# Patient Record
Sex: FEMALE | Race: BLACK OR AFRICAN AMERICAN | NOT HISPANIC OR LATINO | URBAN - METROPOLITAN AREA
[De-identification: names, ages, dates, MRNs, and addresses within clinical notes are randomized per-mention and may not be internally consistent; named-entity substitution may affect disease eponyms.]

---

## 2018-10-31 ENCOUNTER — EMERGENCY (EMERGENCY)
Facility: HOSPITAL | Age: 28
LOS: 1 days | Discharge: HOME | End: 2018-10-31
Attending: EMERGENCY MEDICINE | Admitting: EMERGENCY MEDICINE
Payer: MEDICAID

## 2018-10-31 VITALS
TEMPERATURE: 99 F | DIASTOLIC BLOOD PRESSURE: 72 MMHG | HEART RATE: 97 BPM | OXYGEN SATURATION: 100 % | RESPIRATION RATE: 18 BRPM | SYSTOLIC BLOOD PRESSURE: 120 MMHG

## 2018-10-31 DIAGNOSIS — Z98.89 OTHER SPECIFIED POSTPROCEDURAL STATES: Chronic | ICD-10-CM

## 2018-10-31 LAB
ALBUMIN SERPL ELPH-MCNC: 4.3 G/DL — SIGNIFICANT CHANGE UP (ref 3.3–5)
ALP SERPL-CCNC: 67 U/L — SIGNIFICANT CHANGE UP (ref 40–120)
ALT FLD-CCNC: 15 U/L — SIGNIFICANT CHANGE UP (ref 4–33)
AST SERPL-CCNC: 34 U/L — HIGH (ref 4–32)
BASOPHILS # BLD AUTO: 0.02 K/UL — SIGNIFICANT CHANGE UP (ref 0–0.2)
BASOPHILS NFR BLD AUTO: 0.3 % — SIGNIFICANT CHANGE UP (ref 0–2)
BILIRUB SERPL-MCNC: 0.3 MG/DL — SIGNIFICANT CHANGE UP (ref 0.2–1.2)
BUN SERPL-MCNC: 6 MG/DL — LOW (ref 7–23)
CALCIUM SERPL-MCNC: 9.5 MG/DL — SIGNIFICANT CHANGE UP (ref 8.4–10.5)
CHLORIDE SERPL-SCNC: 104 MMOL/L — SIGNIFICANT CHANGE UP (ref 98–107)
CO2 SERPL-SCNC: 24 MMOL/L — SIGNIFICANT CHANGE UP (ref 22–31)
CREAT SERPL-MCNC: 0.74 MG/DL — SIGNIFICANT CHANGE UP (ref 0.5–1.3)
EOSINOPHIL # BLD AUTO: 0.64 K/UL — HIGH (ref 0–0.5)
EOSINOPHIL NFR BLD AUTO: 10.8 % — HIGH (ref 0–6)
GLUCOSE SERPL-MCNC: 83 MG/DL — SIGNIFICANT CHANGE UP (ref 70–99)
HCT VFR BLD CALC: 35.5 % — SIGNIFICANT CHANGE UP (ref 34.5–45)
HGB BLD-MCNC: 11.7 G/DL — SIGNIFICANT CHANGE UP (ref 11.5–15.5)
IMM GRANULOCYTES # BLD AUTO: 0.01 # — SIGNIFICANT CHANGE UP
IMM GRANULOCYTES NFR BLD AUTO: 0.2 % — SIGNIFICANT CHANGE UP (ref 0–1.5)
LYMPHOCYTES # BLD AUTO: 2.18 K/UL — SIGNIFICANT CHANGE UP (ref 1–3.3)
LYMPHOCYTES # BLD AUTO: 36.9 % — SIGNIFICANT CHANGE UP (ref 13–44)
MCHC RBC-ENTMCNC: 33 % — SIGNIFICANT CHANGE UP (ref 32–36)
MCHC RBC-ENTMCNC: 33 PG — SIGNIFICANT CHANGE UP (ref 27–34)
MCV RBC AUTO: 100 FL — SIGNIFICANT CHANGE UP (ref 80–100)
MONOCYTES # BLD AUTO: 0.44 K/UL — SIGNIFICANT CHANGE UP (ref 0–0.9)
MONOCYTES NFR BLD AUTO: 7.5 % — SIGNIFICANT CHANGE UP (ref 2–14)
NEUTROPHILS # BLD AUTO: 2.61 K/UL — SIGNIFICANT CHANGE UP (ref 1.8–7.4)
NEUTROPHILS NFR BLD AUTO: 44.3 % — SIGNIFICANT CHANGE UP (ref 43–77)
NRBC # FLD: 0 — SIGNIFICANT CHANGE UP
PLATELET # BLD AUTO: 228 K/UL — SIGNIFICANT CHANGE UP (ref 150–400)
PMV BLD: 12.8 FL — SIGNIFICANT CHANGE UP (ref 7–13)
POTASSIUM SERPL-MCNC: 3.6 MMOL/L — SIGNIFICANT CHANGE UP (ref 3.5–5.3)
POTASSIUM SERPL-SCNC: 3.6 MMOL/L — SIGNIFICANT CHANGE UP (ref 3.5–5.3)
PROT SERPL-MCNC: 7.4 G/DL — SIGNIFICANT CHANGE UP (ref 6–8.3)
RBC # BLD: 3.55 M/UL — LOW (ref 3.8–5.2)
RBC # FLD: 13.1 % — SIGNIFICANT CHANGE UP (ref 10.3–14.5)
SODIUM SERPL-SCNC: 140 MMOL/L — SIGNIFICANT CHANGE UP (ref 135–145)
WBC # BLD: 5.9 K/UL — SIGNIFICANT CHANGE UP (ref 3.8–10.5)
WBC # FLD AUTO: 5.9 K/UL — SIGNIFICANT CHANGE UP (ref 3.8–10.5)

## 2018-10-31 PROCEDURE — 99283 EMERGENCY DEPT VISIT LOW MDM: CPT

## 2018-10-31 RX ORDER — ACETAMINOPHEN 500 MG
975 TABLET ORAL ONCE
Qty: 0 | Refills: 0 | Status: COMPLETED | OUTPATIENT
Start: 2018-10-31 | End: 2018-10-31

## 2018-10-31 RX ADMIN — Medication 975 MILLIGRAM(S): at 13:38

## 2018-10-31 NOTE — CONSULT NOTE ADULT - ASSESSMENT
28 Y F with PMH of migraine headache presented to ED for evaluation of headache and right sided weakness. As per patient, she had multiple episodes in the past for headache, where she will have intermittent scintillating scotoma, numbness over right side, weaknesses over right side, word finding difficulties. These aura usually coincides with her headache. Aura will last for half hour to few hours and will resolve. Headache is usually unilateral. right sided, crescendo-decrescendo pattern, associated with nausea, photophobia/phonophobia. She is following outside with 2 neurologist. She is recently started on amitriptyline and Butalbital. She had multiple MRIs in the past which were negative.   Her recent headache started on Saturday. Currently she has 6/10 headache. Neurological examination was positive for subtle weakness on right side with subjective numbness.     Impression     multiple episodes of transient focal neurological deficits   Differential diagnosis includes Migraine, TIA and seizure - Her presentation is not matching with seizure and TIA   Not suspicious for MS as she had episodes lasting for few hours and resolved each time spontaneously for numerous times     Plan     Migraine cocktail   Reglan 10 mg IV , Benadryl 25 mg IV   Toradol 30 mg IV   Magnesium 2 gram IV     She can be discharged on medrol dose pack if headache resolves with above medications. If no improvement in headache, she will need admission for DHE protocol. She can follow with her neurologist or our headache specialist  at [address] [place] [address].   Dexamethsone 28 Y F with PMH of migraine headache presented to ED for evaluation of headache and right sided weakness. As per patient, she had multiple episodes in the past for headache, where she will have intermittent scintillating scotoma, numbness over right side, weaknesses over right side, word finding difficulties. These aura usually coincides with her headache. Aura will last for half hour to few hours and will resolve. Headache is usually unilateral. right sided, crescendo-decrescendo pattern, associated with nausea, photophobia/phonophobia. She is following outside with 2 neurologist. She is recently started on amitriptyline and Butalbital. She had multiple MRIs in the past which were negative.   Her recent headache started on Saturday. Currently she has 6/10 headache. Neurological examination was positive for subtle weakness on right side with subjective numbness.     Impression     multiple episodes of transient focal neurological deficits   Differential diagnosis includes Migraine, TIA and seizure - Her presentation is not matching with seizure and TIA   Not suspicious for MS as she had episodes lasting for few hours and resolved each time spontaneously for numerous times     Plan     Migraine cocktail   Reglan 10 mg IV , Benadryl 25 mg IV   Toradol 30 mg IV   Magnesium 2 gram IV   Dexamethasone 10  IV     She can be discharged on medrol dose pack if headache resolves with above medications. If no improvement in headache, she will need admission for DHE protocol. She can follow with her neurologist or our headache specialist  at [address] [place] [address].     	  Case discussed with .

## 2018-10-31 NOTE — ED PROVIDER NOTE - PMH
Asthma    Atrophy of the muscles  Right upper extremity  Cluster headache    Dizziness    Rhinitis, chronic    Weakness of right side of body

## 2018-10-31 NOTE — ED PROVIDER NOTE - ATTENDING CONTRIBUTION TO CARE
DR. HERNANDEZ, ATTENDING MD-  I performed a face to face bedside interview with patient regarding history of present illness, review of symptoms and past medical history. I completed an independent physical exam.  I have discussed patient's plan of care with the resident.   Documentation as above in the note.    Jose Alejandro: c/o acute on chronic weakness/coldness to right side of body (arm and leg) today.  Has h/o similar sx for "years", including admission in 2013 (records reviewed).  Saw a neurologist 2 days ago for similar sx, at which time an EMG was done.  Has not received results yet, and is pending an MRI.  On exam: well appearing, 2+rt radial pulse, 2+ rt DP pulse, no swelling to RUE or RLE.  Neuro exam: aaox4, fluent speech, CN2-12 intact. No gross sens deficits.  Motor 4/5 rt , and 4/5 throughout RLE.  No rigidity.  brisk and equal patellar reflexes.    A/P: acute on chronic rt sided weakness. Patient has already had extensive imaging for the same complaint, so I do not plan on checking CT.  but out of concern that this is MS with acute flare, or other unclear neurologic process, will consult neuro

## 2018-10-31 NOTE — ED ADULT NURSE NOTE - OBJECTIVE STATEMENT
Pt received a&ox3, c/o right extremity tingling/coldness/numbness x 2 years worse x 2 days, pt has seen Neuro outpatient w neg MRI/CT, pt denies any other neuro deficits, no facial droop/slurred speech/unilateral weakness/decreased unilateral sensation noted, pt radial and pedal pulses equal and appropriate, pt denies any difficulty w gait, NAD, MD eval performed, 20 gauge IV placed in left ac, labs drawn and sent, pt appears comfortable. Pt received a&ox3, c/o right extremity tingling/coldness/numbness x 2 years worse x 2 days, pt has seen Neuro outpatient w neg MRI/CT, pt denies any other neuro deficits, denies dizziness/lightheadedness, no facial droop/slurred speech/unilateral weakness/decreased unilateral sensation noted, pt radial and pedal pulses equal and appropriate, pt denies any difficulty w gait, pt also c/o chronic headaches x 2 years, no worsening or change in acuity of headache, NAD, MD eval performed, 20 gauge IV placed in left ac, labs drawn and sent, pt appears comfortable. Pt received a&ox3, c/o right extremity tingling/coldness/numbness x 2 years worse x 2 days, pt has seen Neuro outpatient w neg MRI/CT, pt denies any other neuro deficits, denies dizziness/lightheadedness/blurry vision/NV, no facial droop/slurred speech/unilateral weakness/decreased unilateral sensation noted, pt radial and pedal pulses equal and appropriate, pt denies any difficulty w gait, pt also c/o chronic headaches x 2 years, no worsening or change in acuity of headache, NAD, MD eval performed, 20 gauge IV placed in left ac, labs drawn and sent, pt appears comfortable.

## 2018-10-31 NOTE — ED ADULT TRIAGE NOTE - CHIEF COMPLAINT QUOTE
patient c/o a headache since Saturday, coldness on the right side of her body with numbness and tingling. Patient states the coldness comes and goes for the past 10 years but has been consistent since Saturday. Right hand noted colder that the left in triage. Patient states she has had and MRI for this 2 years ago. PMH of asthma, no wheezing in triage. Patient states that she feesl nervous.

## 2018-10-31 NOTE — ED PROVIDER NOTE - NSFOLLOWUPINSTRUCTIONS_ED_ALL_ED_FT
1. You were seen by a neurologist today and believed to have a complex migraine. Please follow up with Dr. Fry in 1-3 days and let him/her know you were seen here.  2. Take 600mg Motrin (also called Advil or Ibuprofen) every 6 hours as needed for fever/pain/discomfort/swelling. You can take this with Tylenol 650 mg (also called acetaminophen) every 6 hours.     Don't use more than 3500mg of Tylenol in any 24-hour period. Make sure your other prescription/over-the-counter medications don't contain any Tylenol so you don't take too much.     If you have any stomach discomfort while taking Motrin, you can use TUMS or Pepcid or Zantac (these can all be bought without a prescription).   3. Please return to the ED should you have any new or worsening symptoms or have any new concerns.   4. Read all attached.

## 2018-10-31 NOTE — ED PROVIDER NOTE - OBJECTIVE STATEMENT
Peggy Keene, DO: 28F wit Peggy Yecenia, DO: 28F with hx of cluster HA & asthma here for cold RUE and RLE x 4 days. Symptoms intermittent, associated with intermittent wrist pain & hip pain. Patient has been seen by Neurologist for similar symptoms x 10 years that are progressively worsening. No discoloration of extremities, no fevers, chest pain, SOB.

## 2018-10-31 NOTE — ED PROVIDER NOTE - MEDICAL DECISION MAKING DETAILS
Peggy Keene, DO: 28F with hx neurologic w/u including EMG several days ago with reportedly elevated ESR & hx of MR 10 years ago reportedly normal with progressively worsening symptoms here for cool R side extremities. No subjective or objective discoloration. Extremities with equal pulses.

## 2018-10-31 NOTE — ED PROVIDER NOTE - PROGRESS NOTE DETAILS
Jose Alejandro: neuro has seen and evaluated.  They feel her story and exam are more c/w complex migraine and that she is stable for outpatient follow-up. Peggy Keene DO: Patient notified of discharge instruction but left prior to receiving discharge paperwork. IV d/c'd by RN. Stable for o/p f/u.

## 2018-10-31 NOTE — CONSULT NOTE ADULT - SUBJECTIVE AND OBJECTIVE BOX
HPI:    28 Y F with PMH of migraine headache presented to ED for evaluation of headache and right sided weakness. As per patient, she had multiple episodes in the past for headache, where she will have intermittent scintillating scotoma, numbness over right side, weaknesses over right side, word finding difficulties. These aura usually coincides with her headache. Aura will last for half hour to few hours and will resolve. Headache is usually unilateral. right sided, crescendo-decrescendo pattern, associated with nausea, photophobia/phonophobia. She is following outside with 2 neurologist. She is recently started on amitriptyline and Butalbital. She had multiple MRIs in the past which were negative.   Her recent headache started on Saturday. Currently she has 6/10 headache.     MEDICATIONS  (STANDING):    MEDICATIONS  (PRN):      PAST MEDICAL & SURGICAL HISTORY:  Weakness of right side of body  Dizziness  Atrophy of the muscles: Right upper extremity  Rhinitis, chronic  Cluster headache  Asthma  H/O surgical biopsy: of mandible, negative per pt.  S/P ovarian cystectomy      FAMILY HISTORY:  Family history of melanoma (Grandparent)  Family history of lymphosarcoma (Grandparent)  Family history of renal disease (Father)  Family history of asthma (Father)  Family history of stroke (Mother)  Family history of ankylosing spondylitis (Sibling)  Family history of early CAD (Father)  Family history of diabetes mellitus (Father, Grandparent)      Allergies    eggplant (Angioedema)  No Known Drug Allergies    Intolerances          SHx - No smoking, No ETOH, No drug abuse      Review of Systems:  CONSTITUTIONAL:  No weight loss, fever, chills, weakness or fatigue.  HEENT:  Eyes:  No visual loss, blurred vision, double vision or yellow sclerae. Ears, Nose, Throat:  No hearing loss, sneezing, congestion, runny nose or sore throat.  SKIN:  No rash or itching.  CARDIOVASCULAR:  No chest pain, chest pressure or chest discomfort. No palpitations or edema.  RESPIRATORY:  No shortness of breath, cough or sputum.  GASTROINTESTINAL:  No anorexia, nausea, vomiting or diarrhea. No abdominal pain or blood.  GENITOURINARY:  NO Burning on urination.   NEUROLOGICAL: See HPI  MUSCULOSKELETAL:  No muscle, back pain, joint pain or stiffness.  HEMATOLOGIC:  No anemia, bleeding or bruising.  LYMPHATICS:  No enlarged nodes. No history of splenectomy.  PSYCHIATRIC:  No history of depression or anxiety.  ENDOCRINOLOGIC:  No reports of sweating, cold or heat intolerance. No polyuria or polydipsia.  ALLERGIES:  No history of asthma, hives, eczema or rhinitis.        Vital Signs Last 24 Hrs  T(C): 37 (31 Oct 2018 12:18), Max: 37 (31 Oct 2018 12:18)  T(F): 98.6 (31 Oct 2018 12:18), Max: 98.6 (31 Oct 2018 12:18)  HR: 97 (31 Oct 2018 12:18) (97 - 97)  BP: 120/72 (31 Oct 2018 12:18) (120/72 - 120/72)  BP(mean): --  RR: 18 (31 Oct 2018 12:18) (18 - 18)  SpO2: 100% (31 Oct 2018 12:18) (100% - 100%)    General Exam:   General appearance: No acute distress                   Neurological Exam:    Mental Status: Orientated to self, date and place.  Attention intact.  No dysarthria, aphasia or neglect.  Cranial Nerves: PERRL, EOMI, CN V1-3 intact to light touch and pinprick.  No facial asymmetry, Tongue, uvula and palate midline.    Motor:   Tone: normal.                  Strength: without any drifts, subtle weakness on right side UE and LE   Dysmetria: None to finger-nose-finger or heel-shin-heel  No truncal ataxia.    Tremor: No resting, postural or action tremor.  No myoclonus.  Sensation: subjective numbness over RLE   Deep Tendon Reflexes: 2+ bilateral biceps, triceps, brachioradialis, knee and ankle  Toes flexor bilaterally  Gait: normal and stable.      Other:    10-31    140  |  104  |  6<L>  ----------------------------<  83  3.6   |  24  |  0.74    Ca    9.5      31 Oct 2018 13:45    TPro  7.4  /  Alb  4.3  /  TBili  0.3  /  DBili  x   /  AST  34<H>  /  ALT  15  /  AlkPhos  67  10-31    10-31    140  |  104  |  6<L>  ----------------------------<  83  3.6   |  24  |  0.74    Ca    9.5      31 Oct 2018 13:45    TPro  7.4  /  Alb  4.3  /  TBili  0.3  /  DBili  x   /  AST  34<H>  /  ALT  15  /  AlkPhos  67  10-31                          11.7   5.90  )-----------( 228      ( 31 Oct 2018 13:45 )             35.5       Radiology    no recent imaging

## 2018-10-31 NOTE — ED ADULT NURSE NOTE - NSIMPLEMENTINTERV_GEN_ALL_ED
Implemented All Universal Safety Interventions:  [place] to call system. Call bell, personal items and telephone within reach. Instruct patient to call for assistance. Room bathroom lighting operational. Non-slip footwear when patient is off stretcher. Physically safe environment: no spills, clutter or unnecessary equipment. Stretcher in lowest position, wheels locked, appropriate side rails in place.

## 2018-10-31 NOTE — ED PROVIDER NOTE - PHYSICAL EXAMINATION
Peggy Keene, DO:   Gen: Well appearing, NAD  Head: NCAT  HEENT: PERRL, MMM, normal conjunctiva, anicteric, neck supple  Lung: CTAB, no adventitious sounds  CV: RRR, no murmurs  Abd: soft, NTND, no rebound or guarding  MSK: No edema, no visible deformities  Neuro: CN II-XII intact. 4/5 strength of biceps &  strength. 5/5 strength of all other extremities.   Heme: DP & radial pulses 2/3 b/l.   Skin: R hand cool otherwise warm extremities. No evidence of rash  Psych: anxious mood and affect Peggy Keeen, DO:   Gen: Well appearing, NAD  Head: NCAT  HEENT: PERRL, MMM, normal conjunctiva, anicteric, neck supple  Lung: CTAB, no adventitious sounds  CV: RRR, no murmurs  Abd: soft, NTND, no rebound or guarding  MSK: No edema, no visible deformities  Neuro: CN II-XII intact. 4/5 strength of RUE with proximal worse than distal. 5/5 strength of all other extremities.   Heme: DP & radial pulses 2/3 b/l.   Skin: R hand cool otherwise warm extremities. No evidence of rash  Psych: anxious mood and affect

## 2018-11-01 ENCOUNTER — OUTPATIENT (OUTPATIENT)
Dept: OUTPATIENT SERVICES | Facility: HOSPITAL | Age: 28
LOS: 1 days | End: 2018-11-01
Payer: COMMERCIAL

## 2018-11-01 DIAGNOSIS — Z98.89 OTHER SPECIFIED POSTPROCEDURAL STATES: Chronic | ICD-10-CM

## 2018-11-01 PROCEDURE — G9001: CPT

## 2018-11-05 DIAGNOSIS — Z71.89 OTHER SPECIFIED COUNSELING: ICD-10-CM

## 2019-07-03 ENCOUNTER — EMERGENCY (EMERGENCY)
Facility: HOSPITAL | Age: 29
LOS: 0 days | Discharge: LEFT BEFORE DISCHARGE | End: 2019-07-03
Attending: STUDENT IN AN ORGANIZED HEALTH CARE EDUCATION/TRAINING PROGRAM
Payer: MEDICAID

## 2019-07-03 VITALS
HEIGHT: 63 IN | WEIGHT: 134.92 LBS | SYSTOLIC BLOOD PRESSURE: 108 MMHG | RESPIRATION RATE: 18 BRPM | OXYGEN SATURATION: 100 % | HEART RATE: 100 BPM | TEMPERATURE: 99 F | DIASTOLIC BLOOD PRESSURE: 89 MMHG

## 2019-07-03 DIAGNOSIS — N83.209 UNSPECIFIED OVARIAN CYST, UNSPECIFIED SIDE: ICD-10-CM

## 2019-07-03 DIAGNOSIS — R00.2 PALPITATIONS: ICD-10-CM

## 2019-07-03 DIAGNOSIS — Z98.89 OTHER SPECIFIED POSTPROCEDURAL STATES: Chronic | ICD-10-CM

## 2019-07-03 DIAGNOSIS — J45.909 UNSPECIFIED ASTHMA, UNCOMPLICATED: ICD-10-CM

## 2019-07-03 DIAGNOSIS — G81.91 HEMIPLEGIA, UNSPECIFIED AFFECTING RIGHT DOMINANT SIDE: ICD-10-CM

## 2019-07-03 PROCEDURE — 93010 ELECTROCARDIOGRAM REPORT: CPT

## 2019-07-03 RX ORDER — SODIUM CHLORIDE 9 MG/ML
1000 INJECTION INTRAMUSCULAR; INTRAVENOUS; SUBCUTANEOUS ONCE
Refills: 0 | Status: DISCONTINUED | OUTPATIENT
Start: 2019-07-03 | End: 2019-07-03

## 2019-07-03 NOTE — ED ADULT NURSE NOTE - PSH
H/O surgical biopsy  of mandible, negative per pt.  No significant past surgical history    S/P ovarian cystectomy

## 2019-07-03 NOTE — ED ADULT NURSE NOTE - PMH
Asthma    Asthma    Atrophy of the muscles  Right upper extremity  Cluster headache    Dizziness    Ovarian cyst    Rhinitis, chronic    Weakness of right side of body

## 2019-07-03 NOTE — ED ADULT TRIAGE NOTE - CHIEF COMPLAINT QUOTE
Patient complains of heart palpitations after smoking marijuana today, pt has hx of asthma, calming methods used, EKG completed

## 2019-07-03 NOTE — ED ADULT NURSE NOTE - OBJECTIVE STATEMENT
pt A&OX3 with c/o of heart palpitations after smoking marijuana today around 130pm. Denies Chest pain, Nausea/ vomiting . PMH Asthma

## 2019-11-15 ENCOUNTER — EMERGENCY (EMERGENCY)
Facility: HOSPITAL | Age: 29
LOS: 1 days | Discharge: HOME | End: 2019-11-15
Attending: STUDENT IN AN ORGANIZED HEALTH CARE EDUCATION/TRAINING PROGRAM
Payer: MEDICAID

## 2019-11-15 ENCOUNTER — EMERGENCY (EMERGENCY)
Facility: HOSPITAL | Age: 29
LOS: 1 days | Discharge: LEFT BEFORE DISCHARGE | End: 2019-11-15
Admitting: EMERGENCY MEDICINE

## 2019-11-15 VITALS
OXYGEN SATURATION: 100 % | RESPIRATION RATE: 18 BRPM | HEART RATE: 73 BPM | SYSTOLIC BLOOD PRESSURE: 108 MMHG | DIASTOLIC BLOOD PRESSURE: 57 MMHG | TEMPERATURE: 98 F

## 2019-11-15 VITALS
HEART RATE: 71 BPM | DIASTOLIC BLOOD PRESSURE: 75 MMHG | SYSTOLIC BLOOD PRESSURE: 112 MMHG | RESPIRATION RATE: 20 BRPM | TEMPERATURE: 98 F | OXYGEN SATURATION: 100 %

## 2019-11-15 DIAGNOSIS — Z98.89 OTHER SPECIFIED POSTPROCEDURAL STATES: Chronic | ICD-10-CM

## 2019-11-15 PROCEDURE — 99283 EMERGENCY DEPT VISIT LOW MDM: CPT

## 2019-11-15 RX ORDER — IPRATROPIUM/ALBUTEROL SULFATE 18-103MCG
3 AEROSOL WITH ADAPTER (GRAM) INHALATION ONCE
Refills: 0 | Status: COMPLETED | OUTPATIENT
Start: 2019-11-15 | End: 2019-11-15

## 2019-11-15 RX ORDER — ALBUTEROL 90 UG/1
2.5 AEROSOL, METERED ORAL
Refills: 0 | Status: DISCONTINUED | OUTPATIENT
Start: 2019-11-15 | End: 2019-11-15

## 2019-11-15 RX ADMIN — Medication 3 MILLILITER(S): at 19:47

## 2019-11-15 RX ADMIN — Medication 1 TABLET(S): at 20:46

## 2019-11-15 RX ADMIN — Medication 50 MILLIGRAM(S): at 20:41

## 2019-11-15 NOTE — ED PROVIDER NOTE - PATIENT PORTAL LINK FT
You can access the FollowMyHealth Patient Portal offered by [facility] by registering at the following website: [email or web address]. By joining [employer]’s FollowMyHealth portal, you will also be able to view your health information using other applications (apps) compatible with our system.

## 2019-11-15 NOTE — ED PROVIDER NOTE - CLINICAL SUMMARY MEDICAL DECISION MAKING FREE TEXT BOX
The patient p/w uncomplicated asthma exacerbation/sinusitis, plan for supportive management and d/c home with return precautions. Pt requesting no workup at this time.

## 2019-11-15 NOTE — ED ADULT TRIAGE NOTE - CHIEF COMPLAINT QUOTE
pt reports hx of asthma was here earlier but left because her son is at [facility] ICU for asthma as well. pt took albuterol prior to arrival. reports sinus congestion and wheezing, is not currently wheezing LS clear

## 2019-11-15 NOTE — ED ADULT NURSE NOTE - CHIEF COMPLAINT QUOTE
Pt was a rapid response from the peds. Pt had a asthma attack but used her inhaler and now feels better. No resp distress noted. O2 sat 100%.

## 2019-12-12 ENCOUNTER — EMERGENCY (EMERGENCY)
Facility: HOSPITAL | Age: 29
LOS: 1 days | Discharge: HOME | End: 2019-12-12
Attending: STUDENT IN AN ORGANIZED HEALTH CARE EDUCATION/TRAINING PROGRAM
Payer: MEDICAID

## 2019-12-12 VITALS
OXYGEN SATURATION: 100 % | HEART RATE: 84 BPM | TEMPERATURE: 98 F | SYSTOLIC BLOOD PRESSURE: 121 MMHG | DIASTOLIC BLOOD PRESSURE: 70 MMHG | RESPIRATION RATE: 16 BRPM

## 2019-12-12 DIAGNOSIS — Z98.89 OTHER SPECIFIED POSTPROCEDURAL STATES: Chronic | ICD-10-CM

## 2019-12-12 PROCEDURE — 99283 EMERGENCY DEPT VISIT LOW MDM: CPT

## 2019-12-12 RX ORDER — DEXAMETHASONE 0.5 MG/5ML
10 ELIXIR ORAL ONCE
Refills: 0 | Status: COMPLETED | OUTPATIENT
Start: 2019-12-12 | End: 2019-12-12

## 2019-12-12 RX ORDER — IBUPROFEN 200 MG
600 TABLET ORAL ONCE
Refills: 0 | Status: COMPLETED | OUTPATIENT
Start: 2019-12-12 | End: 2019-12-12

## 2019-12-12 RX ORDER — ACETAMINOPHEN 500 MG
975 TABLET ORAL ONCE
Refills: 0 | Status: COMPLETED | OUTPATIENT
Start: 2019-12-12 | End: 2019-12-12

## 2019-12-12 RX ADMIN — Medication 975 MILLIGRAM(S): at 13:26

## 2019-12-12 RX ADMIN — Medication 10 MILLIGRAM(S): at 13:26

## 2019-12-12 RX ADMIN — Medication 1 TABLET(S): at 13:26

## 2019-12-12 RX ADMIN — Medication 600 MILLIGRAM(S): at 13:25

## 2019-12-12 NOTE — ED PROVIDER NOTE - PATIENT PORTAL LINK FT
You can access the FollowMyHealth Patient Portal offered by [facility] by registering at the following website: [email or web address]. By joining [employer]’s FollowMyHealth portal, you will also be able to view your health information using other applications (apps) compatible with our system.

## 2019-12-12 NOTE — ED PROVIDER NOTE - OBJECTIVE STATEMENT
28 y/o F w/ history of chronic nasal congestion p/w nasal congestion. Pt states she was recently here for similar symptoms, and was prescribed antibiotics, however her symptoms persisted. On exam, pt sounds nasal obstructive w/ rhinorrhea. Denies any fever, chills, nausea, vomiting, or any other acute complaints.

## 2019-12-12 NOTE — ED PROVIDER NOTE - NOSE FINDINGS
+rhinorrhea and inflamed nasal turbinates w/ no signs of exudates. Normal otic exam. Dorsal Nasal Flap Text: The defect edges were debeveled with a #15 scalpel blade.  Given the location of the defect and the proximity to free margins a dorsal nasal flap was deemed most appropriate.  Using a sterile surgical marker, an appropriate dorsal nasal flap was drawn around the defect.    The area thus outlined was incised deep to adipose tissue with a #15 scalpel blade.  The skin margins were undermined to an appropriate distance in all directions utilizing iris scissors.

## 2019-12-12 NOTE — ED ADULT NURSE NOTE - OBJECTIVE STATEMENT
pt is here sinus congestion.  pt stated that sinus congestion, denied fever or chills, denied N/V/d, pt calm at this time.

## 2019-12-12 NOTE — ED ADULT TRIAGE NOTE - CHIEF COMPLAINT QUOTE
A&OX3 c/o sinus infection, pt was seen and treated two weeks ago for same symptoms given antibiotics but states pain is not getting better, denies fever chills

## 2019-12-12 NOTE — ED ADULT NURSE NOTE - NSIMPLEMENTINTERV_GEN_ALL_ED
Implemented All Universal Safety Interventions:  [place] to call system. Call bell, personal items and telephone within reach. Instruct patient to call for assistance. Room bathroom lighting operational. Non-slip footwear when patient is off stretcher. Physically safe environment: no spills, clutter or unnecessary equipment. Stretcher in lowest position, wheels locked, appropriate side rails in place.

## 2020-03-18 ENCOUNTER — EMERGENCY (EMERGENCY)
Facility: HOSPITAL | Age: 30
LOS: 1 days | Discharge: HOME | End: 2020-03-18
Admitting: EMERGENCY MEDICINE
Payer: MEDICAID

## 2020-03-18 VITALS
OXYGEN SATURATION: 100 % | SYSTOLIC BLOOD PRESSURE: 113 MMHG | DIASTOLIC BLOOD PRESSURE: 68 MMHG | RESPIRATION RATE: 15 BRPM | TEMPERATURE: 97 F | HEART RATE: 59 BPM

## 2020-03-18 DIAGNOSIS — Z98.89 OTHER SPECIFIED POSTPROCEDURAL STATES: Chronic | ICD-10-CM

## 2020-03-18 LAB
BASOPHILS # BLD AUTO: 0.02 K/UL — SIGNIFICANT CHANGE UP (ref 0–0.2)
BASOPHILS NFR BLD AUTO: 0.3 % — SIGNIFICANT CHANGE UP (ref 0–2)
EOSINOPHIL # BLD AUTO: 0.34 K/UL — SIGNIFICANT CHANGE UP (ref 0–0.5)
EOSINOPHIL NFR BLD AUTO: 4.8 % — SIGNIFICANT CHANGE UP (ref 0–6)
HCT VFR BLD CALC: 38.6 % — SIGNIFICANT CHANGE UP (ref 34.5–45)
HGB BLD-MCNC: 12.5 G/DL — SIGNIFICANT CHANGE UP (ref 11.5–15.5)
IMM GRANULOCYTES NFR BLD AUTO: 0.3 % — SIGNIFICANT CHANGE UP (ref 0–1.5)
LYMPHOCYTES # BLD AUTO: 0.74 K/UL — LOW (ref 1–3.3)
LYMPHOCYTES # BLD AUTO: 10.4 % — LOW (ref 13–44)
MCHC RBC-ENTMCNC: 32.4 % — SIGNIFICANT CHANGE UP (ref 32–36)
MCHC RBC-ENTMCNC: 33.5 PG — SIGNIFICANT CHANGE UP (ref 27–34)
MCV RBC AUTO: 103.5 FL — HIGH (ref 80–100)
MONOCYTES # BLD AUTO: 0.55 K/UL — SIGNIFICANT CHANGE UP (ref 0–0.9)
MONOCYTES NFR BLD AUTO: 7.7 % — SIGNIFICANT CHANGE UP (ref 2–14)
NEUTROPHILS # BLD AUTO: 5.44 K/UL — SIGNIFICANT CHANGE UP (ref 1.8–7.4)
NEUTROPHILS NFR BLD AUTO: 76.5 % — SIGNIFICANT CHANGE UP (ref 43–77)
NRBC # FLD: 0 K/UL — SIGNIFICANT CHANGE UP (ref 0–0)
PLATELET # BLD AUTO: 219 K/UL — SIGNIFICANT CHANGE UP (ref 150–400)
PMV BLD: 11.4 FL — SIGNIFICANT CHANGE UP (ref 7–13)
RBC # BLD: 3.73 M/UL — LOW (ref 3.8–5.2)
RBC # FLD: 12.1 % — SIGNIFICANT CHANGE UP (ref 10.3–14.5)
WBC # BLD: 7.11 K/UL — SIGNIFICANT CHANGE UP (ref 3.8–10.5)
WBC # FLD AUTO: 7.11 K/UL — SIGNIFICANT CHANGE UP (ref 3.8–10.5)

## 2020-03-18 PROCEDURE — 99284 EMERGENCY DEPT VISIT MOD MDM: CPT

## 2020-03-18 NOTE — ED PROVIDER NOTE - CLINICAL SUMMARY MEDICAL DECISION MAKING FREE TEXT BOX
31 y/o F presenting with sudden LLQ pain x today. patient will appear, vitals stable, she states pain resolved. In the absence of fever, chills,, abdominal pain, symptoms less likely due to diverticulitis. given hx of ovarian cyst and onset of pain, suspicious for ruptured cyst. plan for routine labs, analgesic, TVUS

## 2020-03-18 NOTE — ED PROVIDER NOTE - PATIENT PORTAL LINK FT
You can access the FollowMyHealth Patient Portal offered by [facility] by registering at the following website: [email or web address]. By joining [employer]’s FollowMyHealth portal, you will also be able to view your health information using other applications (apps) compatible with our system.

## 2020-03-18 NOTE — ED PROVIDER NOTE - OBJECTIVE STATEMENT
patient ia 29 y/o patient ia 29 y/o F with hx of ovarian cyst presenting with c/o LLQ abdominal pain x earlier today. She reports pain was sharp in nature, sudden in onset associated with nausea but no vomiting/diarrhea, dysuria, hematuria, fever, chills, vaginal bleeding or discharge. She denies trauma or injuries, food as inciting factor prior to onset of symptoms. patient states that pain has resolved

## 2020-03-18 NOTE — ED ADULT NURSE NOTE - PRIMARY CARE PROVIDER
keep neutral YASMINE with each step  Init. 5/25/17   Kneel and push swing 8'   Shanthi really seemed to enjoy this activity and self-initiated. Held onto edge of swing with bilat arms and able to push and pull back and forth with fair+/good core control to control the motion, including with older sibling(s) on top of swing  Attempted long sitting and push/pull swing, but Shanthi preferred kneeling position this date   Sitting and playing with barnyard 10'  Playing with toys Able to sit upright independently for 7 minutes while playing with toys to each side   Sit/stand while playing with house    Half kneel 6/29/17   FULL Kneel  10' at swing, helping push and pull swing6/29/17  Mod assist required for safety and to remain upright and not rock down to W sit position       Stand to play with dancing toy/ball popping toy 6/29/17   Sitting on swiss ball Init/ 7/20/17   Prone on BOSU Init. 7/20/17   Sitting pull up to stand by PT    Sitting Bowling     Walk/Kick Bowling pins    Sitting on Phil CGA-min at hips and trunk to help prevent loss of balance. Patient able to [facility] SAMIR relative upright posture, but required constant CGA-min on unstble surface      [] Provided verbal/tactile cueing for activities related to strengthening, flexibility, endurance, ROM. (83854)  [x] Provided verbal/tactile cueing for activities related to improving balance, coordination, kinesthetic sense, posture, motor skill, proprioception. (98371)    Therapeutic Activities:     [] Therapeutic activities, direct (one-on-one) patient contact (use of dynamic activities to improve functional performance). (14105)    Gait:   [] Provided training and instruction to the patient for ambulation re-education. (86888)    Self-Care/ADL's  [] Self-care/home management training and compensatory training, meal preparation, safety procedures, and instructions in use of assistive technology devices/adaptive equipment, direct one-on-one contact. (10938)    Home Exercise Program:    [x] Reviewed/Progressed HEP activities related to strengthening, flexibility, endurance, ROM. (84252)  [] Reviewed/Progressed HEP activities related to improving balance, coordination, kinesthetic sense, posture, motor skill, proprioception.  (63172)    Manual Treatments:     [] Provided manual therapy to mobilize soft tissue/joints for the purpose of modulating pain, promoting relaxation,  increasing ROM, reducing/eliminating soft tissue swelling/inflammation/restriction, improving soft tissue extensibility. (19596)    Service Based Modalities:      Timed Code Treatment Minutes: 36' Neuro Re-ed    Total Treatment Minutes:  36'    Treatment/Activity Tolerance: Good overall tolerance. Fatigue noted at conclusion. [x] Patient tolerated treatment well [] Patient limited by fatique  [] Patient limited by pain  [] Patient limited by other medical complications  [] Other:     Prognosis: [x] Good [] Fair  [] Poor    Patient Requires Follow-up: [x] Yes  [] No    Goals:    New Goal as of 8/3/17   1. Patient to crawl in quadriped up a flight of stairs (16 at home) and turn herself around and crawl/slide down the flight on her belly with PT CGA for safety to demonstrate improved independent functional mobility around her home and to increase her strength, balance, and coordination. New Goals as of 1/4/18:  1. Patient will ambulate with 1 HHA from therapist in a controlled environment for 15 feet with Fair control/gait mechanics to improve independence with mobility in home. 2. Patient will stand up independently and maintain standing position for 20 seconds to improve ease with home management skills    3. Patient will sit upright independently on the floor on a stable surface for 5 minutes to improve independence with play activities at home. MET [id number]    New Goal As of 8/2/18: Goal timeframe: 8 weeks  1.  Patient will be able transfer into a kneeling position and maintain unknown

## 2020-03-18 NOTE — ED ADULT NURSE NOTE - OBJECTIVE STATEMENT
Received Pt in results waiting. pt A&OX3, ambulatory. Pt c/o low abd pain x 1 hour with N/V. Pt with history of asthma. Denies any other medical complaints. denies chest pain, sob, diarrhea, constipation, fever/chills, cough, dizziness, headache, urinary complications. pt appears to be stable and in no apparent distress. respirations are equal and nonlabored, no respiratory distress noted. Abdomen soft, nontender to touch. 20 gauge iv placed in the right ac, labs sent. pt stable, awaiting further plan

## 2020-03-19 LAB
ALBUMIN SERPL ELPH-MCNC: 4.2 G/DL — SIGNIFICANT CHANGE UP (ref 3.3–5)
ALP SERPL-CCNC: 62 U/L — SIGNIFICANT CHANGE UP (ref 40–120)
ALT FLD-CCNC: 15 U/L — SIGNIFICANT CHANGE UP (ref 4–33)
ANION GAP SERPL CALC-SCNC: 10 MMO/L — SIGNIFICANT CHANGE UP (ref 7–14)
AST SERPL-CCNC: 25 U/L — SIGNIFICANT CHANGE UP (ref 4–32)
BILIRUB SERPL-MCNC: < 0.2 MG/DL — LOW (ref 0.2–1.2)
BUN SERPL-MCNC: 10 MG/DL — SIGNIFICANT CHANGE UP (ref 7–23)
CALCIUM SERPL-MCNC: 9.3 MG/DL — SIGNIFICANT CHANGE UP (ref 8.4–10.5)
CHLORIDE SERPL-SCNC: 103 MMOL/L — SIGNIFICANT CHANGE UP (ref 98–107)
CO2 SERPL-SCNC: 24 MMOL/L — SIGNIFICANT CHANGE UP (ref 22–31)
CREAT SERPL-MCNC: 0.88 MG/DL — SIGNIFICANT CHANGE UP (ref 0.5–1.3)
GLUCOSE SERPL-MCNC: 104 MG/DL — HIGH (ref 70–99)
HCG SERPL-ACNC: < 5 MIU/ML — SIGNIFICANT CHANGE UP
POTASSIUM SERPL-MCNC: 3.6 MMOL/L — SIGNIFICANT CHANGE UP (ref 3.5–5.3)
POTASSIUM SERPL-SCNC: 3.6 MMOL/L — SIGNIFICANT CHANGE UP (ref 3.5–5.3)
PROT SERPL-MCNC: 7.5 G/DL — SIGNIFICANT CHANGE UP (ref 6–8.3)
SODIUM SERPL-SCNC: 137 MMOL/L — SIGNIFICANT CHANGE UP (ref 135–145)

## 2020-03-19 PROCEDURE — 76830 TRANSVAGINAL US NON-OB: CPT | Mod: 26

## 2021-01-01 NOTE — ED ADULT NURSE NOTE - IN THE PAST 12 MONTHS HAVE YOU USED DRUGS OTHER THAN THOSE REQUIRED FOR MEDICAL REASON?
Toy Ocampo is a 4 month old female accompanied by mother    DIET:       bottle with breast milk       Frequency:    SOLIDS: none   WATER: private well    SLEEPING:       Where: in crib /bassinet in parent's room       Position: back    GROWTH & DEVELOPMENT:       Brings hands together - UNKNOWN       Babbles, laughs aloud - YES       Head steady, sitting - YES       Follows 180 degrees - YES       Responds to noise - YES      CONCERNS  include: Drooling a lot, always chomping on her pacifier mom is curious if it is teething.  Wants to have her spot on back checked.     There are no preventive care reminders to display for this patient.     Patient is due for the topics as listed above and wishes to proceed with them. Orders placed for Immunization(s) Dtap/Tdap/Td, HIB, IPV, Pneumococcal and Rotavirus.       Nursing notes reviewed and accepted.    Birth history, medical history, surgical history and family history reviewed and updated.    PHYSICAL EXAMINATION:  Temperature 98.3 °F (36.8 °C), temperature source Temporal, height 24.5\" (62.2 cm), weight 6.435 kg (14 lb 3 oz), head circumference 39 cm (15.35\").  GENERAL:  Well-appearing infant female,  no acute distress.  Alert and interactive.  SKIN: Warm, normal turgor.  No cyanosis.  No bruises or lesions.  HEAD:  Normocephalic, atraumatic.  Anterior fontanel open, soft and flat.  EYES:  Conjunctivae appear normal with PERRL, EOMI(pupils equal, round, reactive to light, extraocular movements intact), positive red reflex bilaterally.  NOSE:  Appears normal, no flaring.  EARS:  Normal pinnae, no preauricular skin tags or pit.  Tympanic membranes are transparent with good landmarks.  THROAT:  Oropharynx with moist mucous membranes and no lesions.  NECK:  Supple, no lymphadenopathy or masses.  HEART:  Regular rate and rhythm.  Quiet precordium.  Normal S1, S2.  No murmurs, rubs, gallops.   LUNGS:  Clear to auscultation bilaterally.  No wheezes, rales, rhonchi.   Normal work of breathing.  ABDOMEN:  Soft, nontender.  No organomegaly or masses.  GENITALIA: normal female genitalia, no labial adhesions or lesions  MUSCULOSKELETAL:  Hips within normal range of motion.  Negative Irby, Ortolani.  Spine straight.  Normal sacrum.  EXTREMITIES:  Warm, dry, without abnormalities.  NEUROLOGIC:  Normal tone, bulk, strength.    ASSESSMENT/PLAN:    4 month old well female.      1. Encounter for routine child health examination without abnormal findings    2. Need for vaccination              Orders Placed This Encounter   • DTaP HepB IPV Combined Vacc (Pediarix)- IMM12   • HIB Vacc, PRP-OMP (PedvaxHIB)- IMM41   • Pneumococcal Conjugate 13 Valent Vacc (Prevnar 13)- IMM78   • Rotavirus Pentavalent Vacc 3 Dose (RotaTeq)- IMM57        All parental concerns and questions discussed.  Anticipatory guidance provided.              Development              SIDS prevention              Accident prevention: scalding, small toys              No bottle in bed              Analgesics/antipyretics              Sun exposure              Tobacco-free home              Dental care              Lead exposure risk: none              Vitamin D supplementation: recommended    Immunizations per orders.  Risks, benefits and side effects discussed.  RTC(Return to clinic) for 6 month WCE(well child examination) or sooner as needed for illness/concerns.   No

## 2021-05-04 ENCOUNTER — TRANSCRIPTION ENCOUNTER (OUTPATIENT)
Age: 31
End: 2021-05-04

## 2021-05-24 ENCOUNTER — TRANSCRIPTION ENCOUNTER (OUTPATIENT)
Age: 31
End: 2021-05-24

## 2021-06-05 ENCOUNTER — RESULT REVIEW (OUTPATIENT)
Age: 31
End: 2021-06-05

## 2021-11-16 ENCOUNTER — TRANSCRIPTION ENCOUNTER (OUTPATIENT)
Age: 31
End: 2021-11-16

## 2022-01-24 ENCOUNTER — TRANSCRIPTION ENCOUNTER (OUTPATIENT)
Age: 32
End: 2022-01-24

## 2022-02-28 ENCOUNTER — TRANSCRIPTION ENCOUNTER (OUTPATIENT)
Age: 32
End: 2022-02-28

## 2022-06-13 ENCOUNTER — NON-APPOINTMENT (OUTPATIENT)
Age: 32
End: 2022-06-13

## 2022-06-23 ENCOUNTER — EMERGENCY (EMERGENCY)
Facility: HOSPITAL | Age: 32
LOS: 0 days | Discharge: HOME | End: 2022-06-24
Attending: EMERGENCY MEDICINE
Payer: MEDICAID

## 2022-06-23 VITALS
HEIGHT: 63 IN | TEMPERATURE: 98 F | OXYGEN SATURATION: 99 % | DIASTOLIC BLOOD PRESSURE: 72 MMHG | SYSTOLIC BLOOD PRESSURE: 124 MMHG | RESPIRATION RATE: 21 BRPM | HEART RATE: 83 BPM | WEIGHT: 145.06 LBS

## 2022-06-23 DIAGNOSIS — R07.89 OTHER CHEST PAIN: ICD-10-CM

## 2022-06-23 DIAGNOSIS — Z98.89 OTHER SPECIFIED POSTPROCEDURAL STATES: Chronic | ICD-10-CM

## 2022-06-23 DIAGNOSIS — M62.59 MUSCLE WASTING AND ATROPHY, NOT ELSEWHERE CLASSIFIED, MULTIPLE SITES: ICD-10-CM

## 2022-06-23 DIAGNOSIS — J45.901 UNSPECIFIED ASTHMA WITH (ACUTE) EXACERBATION: ICD-10-CM

## 2022-06-23 DIAGNOSIS — Z91.018 ALLERGY TO OTHER FOODS: ICD-10-CM

## 2022-06-23 DIAGNOSIS — R06.02 SHORTNESS OF BREATH: ICD-10-CM

## 2022-06-23 DIAGNOSIS — Z20.822 CONTACT WITH AND (SUSPECTED) EXPOSURE TO COVID-19: ICD-10-CM

## 2022-06-23 DIAGNOSIS — J31.0 CHRONIC RHINITIS: ICD-10-CM

## 2022-06-23 DIAGNOSIS — G44.009 CLUSTER HEADACHE SYNDROME, UNSPECIFIED, NOT INTRACTABLE: ICD-10-CM

## 2022-06-23 DIAGNOSIS — Z82.5 FAMILY HISTORY OF ASTHMA AND OTHER CHRONIC LOWER RESPIRATORY DISEASES: ICD-10-CM

## 2022-06-23 LAB
BASOPHILS # BLD AUTO: 0.04 K/UL — SIGNIFICANT CHANGE UP (ref 0–0.2)
BASOPHILS NFR BLD AUTO: 0.7 % — SIGNIFICANT CHANGE UP (ref 0–2)
EOSINOPHIL # BLD AUTO: 0.77 K/UL — HIGH (ref 0–0.5)
EOSINOPHIL NFR BLD AUTO: 13.8 % — HIGH (ref 0–6)
HCT VFR BLD CALC: 35.1 % — SIGNIFICANT CHANGE UP (ref 34.5–45)
HGB BLD-MCNC: 11.4 G/DL — LOW (ref 11.5–15.5)
IMM GRANULOCYTES NFR BLD AUTO: 0.4 % — SIGNIFICANT CHANGE UP (ref 0–1.5)
LYMPHOCYTES # BLD AUTO: 1.56 K/UL — SIGNIFICANT CHANGE UP (ref 1–3.3)
LYMPHOCYTES # BLD AUTO: 28 % — SIGNIFICANT CHANGE UP (ref 13–44)
MCHC RBC-ENTMCNC: 31.1 PG — SIGNIFICANT CHANGE UP (ref 27–34)
MCHC RBC-ENTMCNC: 32.5 G/DL — SIGNIFICANT CHANGE UP (ref 32–36)
MCV RBC AUTO: 95.9 FL — SIGNIFICANT CHANGE UP (ref 80–100)
MONOCYTES # BLD AUTO: 0.63 K/UL — SIGNIFICANT CHANGE UP (ref 0–0.9)
MONOCYTES NFR BLD AUTO: 11.3 % — SIGNIFICANT CHANGE UP (ref 2–14)
NEUTROPHILS # BLD AUTO: 2.56 K/UL — SIGNIFICANT CHANGE UP (ref 1.8–7.4)
NEUTROPHILS NFR BLD AUTO: 45.8 % — SIGNIFICANT CHANGE UP (ref 43–77)
NRBC # BLD: 0 /100 WBCS — SIGNIFICANT CHANGE UP (ref 0–0)
PLATELET # BLD AUTO: 246 K/UL — SIGNIFICANT CHANGE UP (ref 150–400)
RBC # BLD: 3.66 M/UL — LOW (ref 3.8–5.2)
RBC # FLD: 13.1 % — SIGNIFICANT CHANGE UP (ref 10.3–14.5)
WBC # BLD: 5.58 K/UL — SIGNIFICANT CHANGE UP (ref 3.8–10.5)
WBC # FLD AUTO: 5.58 K/UL — SIGNIFICANT CHANGE UP (ref 3.8–10.5)

## 2022-06-23 PROCEDURE — 93010 ELECTROCARDIOGRAM REPORT: CPT

## 2022-06-23 PROCEDURE — 99284 EMERGENCY DEPT VISIT MOD MDM: CPT

## 2022-06-23 RX ORDER — IPRATROPIUM/ALBUTEROL SULFATE 18-103MCG
3 AEROSOL WITH ADAPTER (GRAM) INHALATION
Refills: 0 | Status: COMPLETED | OUTPATIENT
Start: 2022-06-23 | End: 2022-06-23

## 2022-06-23 RX ORDER — SODIUM CHLORIDE 9 MG/ML
1000 INJECTION INTRAMUSCULAR; INTRAVENOUS; SUBCUTANEOUS ONCE
Refills: 0 | Status: COMPLETED | OUTPATIENT
Start: 2022-06-23 | End: 2022-06-23

## 2022-06-23 RX ADMIN — Medication 125 MILLIGRAM(S): at 22:35

## 2022-06-23 RX ADMIN — Medication 3 MILLILITER(S): at 22:35

## 2022-06-23 RX ADMIN — Medication 3 MILLILITER(S): at 22:56

## 2022-06-23 RX ADMIN — SODIUM CHLORIDE 1000 MILLILITER(S): 9 INJECTION INTRAMUSCULAR; INTRAVENOUS; SUBCUTANEOUS at 23:40

## 2022-06-23 RX ADMIN — SODIUM CHLORIDE 1000 MILLILITER(S): 9 INJECTION INTRAMUSCULAR; INTRAVENOUS; SUBCUTANEOUS at 22:40

## 2022-06-23 RX ADMIN — Medication 3 MILLILITER(S): at 23:15

## 2022-06-23 NOTE — ED PROVIDER NOTE - PHYSICAL EXAMINATION
Vitals: WNL  Gen: AAOx3, NAD, sitting comfortably in stretcher, calm, non-toxic   Head: ncat, perrla, eomi b/l  Neck: supple, no lymphadenopathy, no midline deviation  Heart: rrr, no m/r/g  Lungs: diffuse expiratory wheezing   Abd: soft, nontender, non-distended, no rebound or guarding  Ext: no clubbing/cyanosis/edema  Neuro: sensation and muscle strength intact b/l, steady gait

## 2022-06-23 NOTE — ED ADULT NURSE NOTE - NSICDXPASTSURGICALHX_GEN_ALL_CORE_FT
PAST SURGICAL HISTORY:  H/O surgical biopsy of mandible, negative per pt.    No significant past surgical history     S/P ovarian cystectomy

## 2022-06-23 NOTE — ED ADULT NURSE NOTE - OBJECTIVE STATEMENT
asthma since tuesdya., congestion, wheezing, cough, N.  wednesday- dizziness  Thursday (today)- worsened, extremely SOB.     Initially chest pain, sob, feelign confused. Symptoms improved now.  PMHx Asthma, phospholipid syndrome. NKDA.

## 2022-06-23 NOTE — ED ADULT TRIAGE NOTE - CHIEF COMPLAINT QUOTE
hx of asthma and antiphospholipid syndrome PW asthma exacerbation pt reports last episode 1 year ago. off ASA x 2 years. C/O headache and chest pain.  Audibile wheezing noted in triage.

## 2022-06-23 NOTE — ED PROVIDER NOTE - NSICDXPASTMEDICALHX_GEN_ALL_CORE_FT
PAST MEDICAL HISTORY:  Asthma     Asthma     Atrophy of the muscles Right upper extremity    Cluster headache     Dizziness     Ovarian cyst     Rhinitis, chronic     Weakness of right side of body

## 2022-06-23 NOTE — ED PROVIDER NOTE - PATIENT PORTAL LINK FT
You can access the FollowMyHealth Patient Portal offered by [facility] by registering at the following website: [email or web address]. By joining [facility]’s FollowMyHealth portal, you will also be able to view your health information using other applications (apps) compatible with our system.

## 2022-06-23 NOTE — ED PROVIDER NOTE - OBJECTIVE STATEMENT
31 yo F with acute asthma exacerbation x 1 day.  Pt. felt chest tightness typical of her asthma exacerbation, + wheezing, + sob.  Pt. feels like she's developing a cold with congestion and post-nasal drip for the last day, which she thinks probably set off exacerbation.  Pt. had more severe asthma as a childhood, but last adult exacerbation was 1 year ago.  No other complaints.    ROS: negative for fever, cough, headache, chest pain, abd pain, nausea, vomiting, diarrhea, rash, paresthesia, and focal weakness--all other systems reviewed are negative.   PMH: asthma, cluster HA; Meds: albuterol; SH: Denies smoking/drinking/drug use

## 2022-06-23 NOTE — ED PROVIDER NOTE - CARE PROVIDER_API CALL
Joseluis Dominguez)  Critical Care Medicine; Internal Medicine; Pulmonary Disease  [facility] [place] [address]  Phone: [phone]  Fax: [phone]  Follow Up Time: Urgent

## 2022-06-23 NOTE — ED PROVIDER NOTE - CLINICAL SUMMARY MEDICAL DECISION MAKING FREE TEXT BOX
31 yo F with acute asthma, likely viral syndrome, doubt pna  -basic labs, hcg, rvp, cxr, ekg, iv, hydration bolus, combinebs, solu-medrol  -f/u results, reeval

## 2022-06-23 NOTE — ED ADULT NURSE NOTE - NSICDXFAMILYHX_GEN_ALL_CORE_FT
FAMILY HISTORY:  Father  Still living? Unknown  Family history of asthma, Age at diagnosis: Age Unknown  Family history of diabetes mellitus, Age at diagnosis: Age Unknown  Family history of early CAD, Age at diagnosis: Age Unknown  Family history of renal disease, Age at diagnosis: Age Unknown    Mother  Still living? Unknown  Family history of stroke, Age at diagnosis: Age Unknown    Sibling  Still living? Unknown  Family history of ankylosing spondylitis, Age at diagnosis: Age Unknown    Grandparent  Still living? Unknown  Family history of diabetes mellitus, Age at diagnosis: Age Unknown  Family history of lymphosarcoma, Age at diagnosis: Age Unknown  Family history of melanoma, Age at diagnosis: Age Unknown

## 2022-06-24 LAB
ALBUMIN SERPL ELPH-MCNC: 3.8 G/DL — SIGNIFICANT CHANGE UP (ref 3.3–5)
ALP SERPL-CCNC: 76 U/L — SIGNIFICANT CHANGE UP (ref 40–120)
ALT FLD-CCNC: 21 U/L — SIGNIFICANT CHANGE UP (ref 12–78)
ANION GAP SERPL CALC-SCNC: 10 MMOL/L — SIGNIFICANT CHANGE UP (ref 5–17)
AST SERPL-CCNC: 48 U/L — HIGH (ref 15–37)
BILIRUB SERPL-MCNC: 0.4 MG/DL — SIGNIFICANT CHANGE UP (ref 0.2–1.2)
BUN SERPL-MCNC: 9 MG/DL — SIGNIFICANT CHANGE UP (ref 7–23)
CALCIUM SERPL-MCNC: 9.6 MG/DL — SIGNIFICANT CHANGE UP (ref 8.5–10.1)
CHLORIDE SERPL-SCNC: 103 MMOL/L — SIGNIFICANT CHANGE UP (ref 96–108)
CO2 SERPL-SCNC: 24 MMOL/L — SIGNIFICANT CHANGE UP (ref 22–31)
CREAT SERPL-MCNC: 0.81 MG/DL — SIGNIFICANT CHANGE UP (ref 0.5–1.3)
EGFR: 98 ML/MIN/1.73M2 — SIGNIFICANT CHANGE UP
GLUCOSE SERPL-MCNC: 90 MG/DL — SIGNIFICANT CHANGE UP (ref 70–99)
HCG SERPL-ACNC: <1 MIU/ML — SIGNIFICANT CHANGE UP
HPIV3 RNA SPEC QL NAA+PROBE: DETECTED
POTASSIUM SERPL-MCNC: 4.1 MMOL/L — SIGNIFICANT CHANGE UP (ref 3.5–5.3)
POTASSIUM SERPL-SCNC: 4.1 MMOL/L — SIGNIFICANT CHANGE UP (ref 3.5–5.3)
PROT SERPL-MCNC: 8.3 GM/DL — SIGNIFICANT CHANGE UP (ref 6–8.3)
RAPID RVP RESULT: DETECTED
SARS-COV-2 RNA SPEC QL NAA+PROBE: SIGNIFICANT CHANGE UP
SODIUM SERPL-SCNC: 137 MMOL/L — SIGNIFICANT CHANGE UP (ref 135–145)

## 2022-06-24 PROCEDURE — 71045 X-RAY EXAM CHEST 1 VIEW: CPT | Mod: 26

## 2022-06-24 RX ORDER — ALBUTEROL 90 UG/1
2 AEROSOL, METERED ORAL
Qty: 1 | Refills: 0
Start: 2022-06-24 | End: 2022-07-23

## 2022-08-29 NOTE — ED ADULT TRIAGE NOTE - NSWEIGHTCALCTOOLDRUG_GEN_A_CORE
used Oral Minoxidil Pregnancy And Lactation Text: This medication should only be used when clearly needed if you are pregnant, attempting to become pregnant or breast feeding.

## 2022-11-29 ENCOUNTER — EMERGENCY (EMERGENCY)
Facility: HOSPITAL | Age: 32
LOS: 1 days | Discharge: LEFT BEFORE DISCHARGE | End: 2022-11-29
Admitting: EMERGENCY MEDICINE

## 2022-11-29 VITALS
RESPIRATION RATE: 23 BRPM | TEMPERATURE: 98 F | SYSTOLIC BLOOD PRESSURE: 137 MMHG | OXYGEN SATURATION: 100 % | HEART RATE: 95 BPM | DIASTOLIC BLOOD PRESSURE: 79 MMHG

## 2022-11-29 DIAGNOSIS — Z98.89 OTHER SPECIFIED POSTPROCEDURAL STATES: Chronic | ICD-10-CM

## 2022-11-29 PROCEDURE — L9992: CPT

## 2022-11-29 NOTE — ED ADULT TRIAGE NOTE - CHIEF COMPLAINT QUOTE
Pt arrives ambulatory to triage c/o left flank, bilat hand, and bilat feet pain since Weds. Unknown injury. Also endorses using inhaler more frequently. Tachypnea noted in triage, 3L NC applied. PMH: asthma.

## 2022-11-29 NOTE — ED ADULT NURSE NOTE - BEFAST SCREENING
Reached out and spoke with Yara at [facility] \"[facility]\" [phone].  She reports patient is not currently at the facility.  She states she believes pt is at [facility].  RN verified, per documentation in pt's EMR, pt was last taken to [facility] ED on 04/09/2021 for G-tube Dislodgement but was discharged back to the facility on 04/10/2021 as noted by ED RN on 04/10/2021 at 344pm:    \"Ari here to transport patient back to [facility]. All paperwork provided to Ari, report given. All belongings with patient. Patient depends dry.\"    Yara transferred call to Jennifer, the \"nurse liaison/admission director at the facility, call went to a \"confidental voice mail\".  RN left message requesting a callback with update of pt's status, RN will await call back.     Addendum at 1050:    Jennifer called back, call transferred by Grace in our call center, RN spoke with Jnenifer the \"nurse liaison/admission director\".  Jennifer did confirm pt was indeed transferred back to [facility] \"[facility] on 04/10/2021 and pt does remain at the facility at this time and has not yet been discharged.  Will continue to monitor for patient's discharge status.     Negative

## 2022-11-29 NOTE — ED ADULT TRIAGE NOTE - RESPIRATORY RATE (BREATHS/MIN)
Patient:   BRYCE TAYLOR            MRN: [id number]            FIN: [id number]              Age:   70 years     Sex:  MALE     :  45   Associated Diagnoses:   None   Author:   CANDICE, MARIO COTTRELL     Personally seen and examined. Labs and imaging personally reviewed.  S: No acute overnight events. Denies chest pain and dyspnea while at rest. Had to be bumped up fom 4L to 5L today.  O: Vitals per chart. Afebrile, HR , 87-95% 4L. NAD, NC in place, clear anteriorly, RRR, abd soft, NABS, NTND, no LE edema. Net 600cc negative in the last 24 hours. Labs per chart. ABG 7.43/37/65 yesterday. Hgb 11.3, stable. COVID-19 negative x2. Urine Legionella and Strep pneumo Ag negative. BCx 6/3/2020 negative. SCx 2020 negative. BAL 2020 negative. Echo 6/3/2020 with EF 50-55%, normal diastolic function, LA dilation, mild RV  dilation, RVSP 57, RA dilation. CT chest 6/3/2020 personally reviewed with bilateral GGOs (L > R) on top of overlying fibrosis with anterior loculated pleural effusion from RUL lobectomy and LAD of stations 2R, 4R, and 7 LNs. LE Dopplers 6/3/2020 negative. CXR 2020 personally reviewed with diffuse scattered haziness. HRCT 2020 personally reviewed with RUL lobectomy with adjacent anterior loculated pleural effusion and fibrotic  changes (L > R, basilar predominance).  A/P: Mr. Taylor is a 71yo M with HF s/p OHT , COPD, recent diagnosis of squamous cell lung cancer s/p RUL lobectomy 2020 who presents with progressive dyspnea and productive cough, found to have bilateral GGOs concerning for pneumonia.  Acute hypoxemic respiratory failure  Abnormal CT chest  Squamous cell lung cancer s/p RUL lobectomy 2020 with Dr. Nathan  Suspected pneumonia  Anemia  Chronic HF s/p OHT   COPD without acute exacerbation  Titrate supplemental oxygen to maintain SpO2 90-96%  Continue IS, OOB, and ambulation as tolerated to prevent atelectasis  Oxygen determination on day of  discharge  Recommend repeat HRCT in about 1-2 months  Off antibiotics; appreciate ID assistance  No wheeze on exam, no concern for acute exacerbation  On LABA/ICS (Symbicort 2 puffs BID) and dextromethorphan-guaifenesin  On Lasix 20mg PO daily  Cardiac and volume optimization per HF team  Immunosuppression and prophylaxis per transplant team  On aspirin and pravastatin  Transfuse for Hgb < 7.0  SQH for DVT prophylaxis  No indication for GI prophylaxis  Khari Fernandez MD  [facility]   23

## 2022-12-05 ENCOUNTER — EMERGENCY (EMERGENCY)
Facility: HOSPITAL | Age: 32
LOS: 0 days | Discharge: LEFT BEFORE DISCHARGE | End: 2022-12-05

## 2022-12-05 VITALS
DIASTOLIC BLOOD PRESSURE: 64 MMHG | WEIGHT: 149.91 LBS | SYSTOLIC BLOOD PRESSURE: 96 MMHG | OXYGEN SATURATION: 99 % | TEMPERATURE: 97 F | HEIGHT: 64 IN | HEART RATE: 66 BPM | RESPIRATION RATE: 17 BRPM

## 2022-12-05 DIAGNOSIS — Z98.89 OTHER SPECIFIED POSTPROCEDURAL STATES: Chronic | ICD-10-CM

## 2022-12-05 DIAGNOSIS — Z53.21 PROCEDURE AND TREATMENT NOT CARRIED OUT DUE TO PATIENT LEAVING PRIOR TO BEING SEEN BY HEALTH CARE PROVIDER: ICD-10-CM

## 2022-12-05 DIAGNOSIS — R00.2 PALPITATIONS: ICD-10-CM

## 2022-12-05 PROCEDURE — 93010 ELECTROCARDIOGRAM REPORT: CPT

## 2022-12-05 PROCEDURE — L9991: CPT

## 2022-12-07 ENCOUNTER — NON-APPOINTMENT (OUTPATIENT)
Age: 32
End: 2022-12-07

## 2023-03-11 ENCOUNTER — NON-APPOINTMENT (OUTPATIENT)
Age: 33
End: 2023-03-11

## 2023-03-14 ENCOUNTER — APPOINTMENT (OUTPATIENT)
Dept: ORTHOPEDIC SURGERY | Facility: CLINIC | Age: 33
End: 2023-03-14

## 2023-03-21 PROBLEM — Z00.00 ENCOUNTER FOR PREVENTIVE HEALTH EXAMINATION: Noted: 2023-03-21

## 2023-03-22 ENCOUNTER — APPOINTMENT (OUTPATIENT)
Dept: PULMONOLOGY | Facility: CLINIC | Age: 33
End: 2023-03-22
Payer: MEDICAID

## 2023-03-22 VITALS
TEMPERATURE: 97.9 F | WEIGHT: 145 LBS | SYSTOLIC BLOOD PRESSURE: 121 MMHG | BODY MASS INDEX: 25.69 KG/M2 | HEART RATE: 95 BPM | HEIGHT: 63 IN | OXYGEN SATURATION: 100 % | DIASTOLIC BLOOD PRESSURE: 75 MMHG | RESPIRATION RATE: 12 BRPM

## 2023-03-22 PROCEDURE — 94060 EVALUATION OF WHEEZING: CPT

## 2023-03-22 PROCEDURE — 94729 DIFFUSING CAPACITY: CPT

## 2023-03-22 PROCEDURE — 95012 NITRIC OXIDE EXP GAS DETER: CPT

## 2023-03-22 PROCEDURE — 99203 OFFICE O/P NEW LOW 30 MIN: CPT | Mod: 25

## 2023-03-22 PROCEDURE — 94726 PLETHYSMOGRAPHY LUNG VOLUMES: CPT

## 2023-03-29 NOTE — ASSESSMENT
[FreeTextEntry1] : In summary the patient is a 33-year-old female past medical history for asthma status post COVID who presents for pulmonary evaluation.  Patient's physical exam is significant for good air entry bilaterally.  She underwent a pulmonary function test which revealed mild obstructive lung disease\par \par The patient has been using her rescue inhaler an excessive amount during the week and therefore is being started on triple therapy for the time being and will follow-up in 1 month

## 2023-03-29 NOTE — HISTORY OF PRESENT ILLNESS
[Never] : never [Current] : current [TextBox_4] : Patient is a 33 year old female with history of Asthma presents for Pulmonary evaluation \par \par \par Patient reports having COVID at the end of January which symptoms included chest pain, sore throat and cough. Since then she has been experiencing increased shortness of breath, wheezing, and dry cough,. Denies any fevers, chills, chest pain, Or hemoptysis + chest pressure Currently using rescue inhaler abut 4 times/week

## 2023-04-04 ENCOUNTER — NON-APPOINTMENT (OUTPATIENT)
Age: 33
End: 2023-04-04

## 2023-04-10 ENCOUNTER — APPOINTMENT (OUTPATIENT)
Dept: PULMONOLOGY | Facility: CLINIC | Age: 33
End: 2023-04-10

## 2023-04-10 ENCOUNTER — APPOINTMENT (OUTPATIENT)
Dept: PULMONOLOGY | Facility: CLINIC | Age: 33
End: 2023-04-10
Payer: MEDICAID

## 2023-04-10 VITALS
OXYGEN SATURATION: 100 % | DIASTOLIC BLOOD PRESSURE: 80 MMHG | BODY MASS INDEX: 25.69 KG/M2 | TEMPERATURE: 98 F | HEIGHT: 63 IN | SYSTOLIC BLOOD PRESSURE: 124 MMHG | HEART RATE: 86 BPM | WEIGHT: 145 LBS | RESPIRATION RATE: 14 BRPM

## 2023-04-10 DIAGNOSIS — J45.909 UNSPECIFIED ASTHMA, UNCOMPLICATED: ICD-10-CM

## 2023-04-10 DIAGNOSIS — Z78.9 OTHER SPECIFIED HEALTH STATUS: ICD-10-CM

## 2023-04-10 DIAGNOSIS — Z87.09 PERSONAL HISTORY OF OTHER DISEASES OF THE RESPIRATORY SYSTEM: ICD-10-CM

## 2023-04-10 PROCEDURE — 99214 OFFICE O/P EST MOD 30 MIN: CPT

## 2023-04-10 RX ORDER — FAMOTIDINE 40 MG/1
40 TABLET, FILM COATED ORAL DAILY
Qty: 90 | Refills: 1 | Status: ACTIVE | COMMUNITY
Start: 2023-04-10 | End: 1900-01-01

## 2023-04-10 NOTE — ASSESSMENT
[FreeTextEntry1] : In summary the patient is a 33-year-old female past medical history for asthma status post COVID who presents for follow-up.  The patient complains of chest discomfort and tightness especially when she is supine.  On further history taking she needs a tremendous amount of spicy foods and drinks a tremendous amount of coffee.  The patient is now started on famotidine and instructed to follow-up in 1 month

## 2023-04-10 NOTE — HISTORY OF PRESENT ILLNESS
[Never] : never [Former] : former [TextBox_4] : Patient is a 33 year old female with history of asthma who presents for follow-up \par \par \par She reports completing full course of Trelegy and reports she continues to suffer with shortness of breath and chest tightness/ + palpation . She does states that the cough has gotten better. Denies any fevers, chills, chest pain, or hemoptysis

## 2023-05-13 ENCOUNTER — EMERGENCY (EMERGENCY)
Facility: HOSPITAL | Age: 33
LOS: 0 days | Discharge: HOME | End: 2023-05-14
Attending: EMERGENCY MEDICINE
Payer: MEDICAID

## 2023-05-13 VITALS
HEIGHT: 63 IN | DIASTOLIC BLOOD PRESSURE: 76 MMHG | WEIGHT: 147.05 LBS | RESPIRATION RATE: 17 BRPM | HEART RATE: 93 BPM | TEMPERATURE: 99 F | SYSTOLIC BLOOD PRESSURE: 116 MMHG | OXYGEN SATURATION: 98 %

## 2023-05-13 DIAGNOSIS — J45.909 UNSPECIFIED ASTHMA, UNCOMPLICATED: ICD-10-CM

## 2023-05-13 DIAGNOSIS — Z87.42 PERSONAL HISTORY OF OTHER DISEASES OF THE FEMALE GENITAL TRACT: ICD-10-CM

## 2023-05-13 DIAGNOSIS — M54.9 DORSALGIA, UNSPECIFIED: ICD-10-CM

## 2023-05-13 DIAGNOSIS — Z98.89 OTHER SPECIFIED POSTPROCEDURAL STATES: Chronic | ICD-10-CM

## 2023-05-13 DIAGNOSIS — O34.11 MATERNAL CARE FOR BENIGN TUMOR OF CORPUS UTERI, FIRST TRIMESTER: ICD-10-CM

## 2023-05-13 DIAGNOSIS — D25.9 LEIOMYOMA OF UTERUS, UNSPECIFIED: ICD-10-CM

## 2023-05-13 DIAGNOSIS — R10.9 UNSPECIFIED ABDOMINAL PAIN: ICD-10-CM

## 2023-05-13 DIAGNOSIS — Z91.018 ALLERGY TO OTHER FOODS: ICD-10-CM

## 2023-05-13 DIAGNOSIS — Z3A.01 LESS THAN 8 WEEKS GESTATION OF PREGNANCY: ICD-10-CM

## 2023-05-13 PROCEDURE — 99285 EMERGENCY DEPT VISIT HI MDM: CPT

## 2023-05-13 NOTE — ED ADULT TRIAGE NOTE - CHIEF COMPLAINT QUOTE
pt c/o L-side lower back pain radiating to L-groin area x 2 days.  increased urinary frequency, pressure. denies vaginal discharge.  denies injury/trauma.  tylenol 2 tabs at 9pm without relief.  (LMP- 4/15/23)  pmh ovarian cysts.

## 2023-05-13 NOTE — ED ADULT TRIAGE NOTE - LOCATION:
Thank you for choosing the [facility] [place], Deisi Godwin MD, and our team as your Neurology Specialists.  We actively use feedback to constantly improve and deliver the best care possible. To provide the best experience, we are collecting feedback from you on how we performed.  You may receive a survey in the mail to evaluate how we did. Please take a moment and share your thoughts.      If for any reason you feel that we did not meet your expectations or you want to share a positive experience, please give us a call. Your feedback helps us know how we are doing and what we can be doing better.    Office hours: 8:00 am to 4:30 pm, Monday - Friday  Phone: [phone]     Left arm;

## 2023-05-14 VITALS
TEMPERATURE: 98 F | RESPIRATION RATE: 17 BRPM | DIASTOLIC BLOOD PRESSURE: 66 MMHG | OXYGEN SATURATION: 97 % | HEART RATE: 66 BPM | SYSTOLIC BLOOD PRESSURE: 110 MMHG

## 2023-05-14 LAB
ALBUMIN SERPL ELPH-MCNC: 3.6 G/DL — SIGNIFICANT CHANGE UP (ref 3.3–5)
ALP SERPL-CCNC: 50 U/L — SIGNIFICANT CHANGE UP (ref 40–120)
ALT FLD-CCNC: 23 U/L — SIGNIFICANT CHANGE UP (ref 12–78)
ANION GAP SERPL CALC-SCNC: 2 MMOL/L — LOW (ref 5–17)
APPEARANCE UR: ABNORMAL
AST SERPL-CCNC: 24 U/L — SIGNIFICANT CHANGE UP (ref 15–37)
BACTERIA # UR AUTO: ABNORMAL
BASOPHILS # BLD AUTO: 0.03 K/UL — SIGNIFICANT CHANGE UP (ref 0–0.2)
BASOPHILS NFR BLD AUTO: 0.4 % — SIGNIFICANT CHANGE UP (ref 0–2)
BILIRUB SERPL-MCNC: 0.3 MG/DL — SIGNIFICANT CHANGE UP (ref 0.2–1.2)
BILIRUB UR-MCNC: NEGATIVE — SIGNIFICANT CHANGE UP
BLD GP AB SCN SERPL QL: SIGNIFICANT CHANGE UP
BUN SERPL-MCNC: 12 MG/DL — SIGNIFICANT CHANGE UP (ref 7–23)
CALCIUM SERPL-MCNC: 9 MG/DL — SIGNIFICANT CHANGE UP (ref 8.5–10.1)
CHLORIDE SERPL-SCNC: 110 MMOL/L — HIGH (ref 96–108)
CO2 SERPL-SCNC: 25 MMOL/L — SIGNIFICANT CHANGE UP (ref 22–31)
COLOR SPEC: YELLOW — SIGNIFICANT CHANGE UP
CREAT SERPL-MCNC: 0.83 MG/DL — SIGNIFICANT CHANGE UP (ref 0.5–1.3)
DIFF PNL FLD: NEGATIVE — SIGNIFICANT CHANGE UP
EGFR: 94 ML/MIN/1.73M2 — SIGNIFICANT CHANGE UP
EOSINOPHIL # BLD AUTO: 0.67 K/UL — HIGH (ref 0–0.5)
EOSINOPHIL NFR BLD AUTO: 7.9 % — HIGH (ref 0–6)
EPI CELLS # UR: ABNORMAL
GLUCOSE SERPL-MCNC: 93 MG/DL — SIGNIFICANT CHANGE UP (ref 70–99)
GLUCOSE UR QL: NEGATIVE MG/DL — SIGNIFICANT CHANGE UP
HCG SERPL-ACNC: 933 MIU/ML — HIGH
HCT VFR BLD CALC: 35.9 % — SIGNIFICANT CHANGE UP (ref 34.5–45)
HGB BLD-MCNC: 11.6 G/DL — SIGNIFICANT CHANGE UP (ref 11.5–15.5)
IMM GRANULOCYTES NFR BLD AUTO: 0.2 % — SIGNIFICANT CHANGE UP (ref 0–0.9)
KETONES UR-MCNC: NEGATIVE — SIGNIFICANT CHANGE UP
LEUKOCYTE ESTERASE UR-ACNC: ABNORMAL
LIDOCAIN IGE QN: 214 U/L — SIGNIFICANT CHANGE UP (ref 73–393)
LYMPHOCYTES # BLD AUTO: 2.66 K/UL — SIGNIFICANT CHANGE UP (ref 1–3.3)
LYMPHOCYTES # BLD AUTO: 31.2 % — SIGNIFICANT CHANGE UP (ref 13–44)
MCHC RBC-ENTMCNC: 32.3 G/DL — SIGNIFICANT CHANGE UP (ref 32–36)
MCHC RBC-ENTMCNC: 32.4 PG — SIGNIFICANT CHANGE UP (ref 27–34)
MCV RBC AUTO: 100.3 FL — HIGH (ref 80–100)
MONOCYTES # BLD AUTO: 0.58 K/UL — SIGNIFICANT CHANGE UP (ref 0–0.9)
MONOCYTES NFR BLD AUTO: 6.8 % — SIGNIFICANT CHANGE UP (ref 2–14)
NEUTROPHILS # BLD AUTO: 4.57 K/UL — SIGNIFICANT CHANGE UP (ref 1.8–7.4)
NEUTROPHILS NFR BLD AUTO: 53.5 % — SIGNIFICANT CHANGE UP (ref 43–77)
NITRITE UR-MCNC: NEGATIVE — SIGNIFICANT CHANGE UP
NRBC # BLD: 0 /100 WBCS — SIGNIFICANT CHANGE UP (ref 0–0)
PH UR: 7 — SIGNIFICANT CHANGE UP (ref 5–8)
PLATELET # BLD AUTO: 237 K/UL — SIGNIFICANT CHANGE UP (ref 150–400)
POTASSIUM SERPL-MCNC: 3.9 MMOL/L — SIGNIFICANT CHANGE UP (ref 3.5–5.3)
POTASSIUM SERPL-SCNC: 3.9 MMOL/L — SIGNIFICANT CHANGE UP (ref 3.5–5.3)
PROT SERPL-MCNC: 7.2 GM/DL — SIGNIFICANT CHANGE UP (ref 6–8.3)
PROT UR-MCNC: 15 MG/DL
RBC # BLD: 3.58 M/UL — LOW (ref 3.8–5.2)
RBC # FLD: 12.6 % — SIGNIFICANT CHANGE UP (ref 10.3–14.5)
RBC CASTS # UR COMP ASSIST: NEGATIVE /HPF — SIGNIFICANT CHANGE UP (ref 0–4)
SODIUM SERPL-SCNC: 137 MMOL/L — SIGNIFICANT CHANGE UP (ref 135–145)
SP GR SPEC: 1.01 — SIGNIFICANT CHANGE UP (ref 1.01–1.02)
UROBILINOGEN FLD QL: NEGATIVE MG/DL — SIGNIFICANT CHANGE UP
WBC # BLD: 8.53 K/UL — SIGNIFICANT CHANGE UP (ref 3.8–10.5)
WBC # FLD AUTO: 8.53 K/UL — SIGNIFICANT CHANGE UP (ref 3.8–10.5)
WBC UR QL: SIGNIFICANT CHANGE UP

## 2023-05-14 PROCEDURE — 76830 TRANSVAGINAL US NON-OB: CPT | Mod: 26

## 2023-05-14 PROCEDURE — 76856 US EXAM PELVIC COMPLETE: CPT | Mod: 26

## 2023-05-14 RX ORDER — KETOROLAC TROMETHAMINE 30 MG/ML
30 SYRINGE (ML) INJECTION ONCE
Refills: 0 | Status: DISCONTINUED | OUTPATIENT
Start: 2023-05-14 | End: 2023-05-14

## 2023-05-14 RX ORDER — SODIUM CHLORIDE 9 MG/ML
1000 INJECTION INTRAMUSCULAR; INTRAVENOUS; SUBCUTANEOUS ONCE
Refills: 0 | Status: COMPLETED | OUTPATIENT
Start: 2023-05-14 | End: 2023-05-14

## 2023-05-14 RX ADMIN — Medication 30 MILLIGRAM(S): at 03:28

## 2023-05-14 RX ADMIN — Medication 30 MILLIGRAM(S): at 02:01

## 2023-05-14 RX ADMIN — SODIUM CHLORIDE 1000 MILLILITER(S): 9 INJECTION INTRAMUSCULAR; INTRAVENOUS; SUBCUTANEOUS at 02:01

## 2023-05-14 NOTE — ED ADULT NURSE NOTE - OBJECTIVE STATEMENT
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 patient is a&ox4 complaining of left lower back pain that radiates to left groin area for past 2 days. patient endorses having difficulty and pain when  urinating for past 2 days. patient states feeling nauseous with no vomiting. pain is 10/10 described as pressure. patient took tylenol at 9pm and found no relief. patient states standing exacerbates the pain. Denies diarrhea, vomiting, chest pain, numbness/tingling. PMH kidney stones, ovarian cysts

## 2023-05-14 NOTE — ED ADULT NURSE NOTE - NSFALLUNIVINTERV_ED_ALL_ED
Bed/Stretcher in lowest position, wheels locked, appropriate side rails in place/Call bell, personal items and telephone in reach/Instruct patient to call for assistance before getting out of bed/chair/stretcher/Non-slip footwear applied when patient is off stretcher/[place] to call system/Physically safe environment - no spills, clutter or unnecessary equipment/Purposeful proactive rounding/Room/bathroom lighting operational, light cord in reach

## 2023-05-14 NOTE — ED PROVIDER NOTE - CARE PROVIDER_API CALL
Karlos Lemon  OBSTETRICS AND GYNECOLOGY  [address] [id number]  Phone: [phone]  Fax: [phone]  Follow Up Time:

## 2023-05-14 NOTE — ED ADULT NURSE NOTE - NS ED NURSE DISCH DISPOSITION
Left message to review ultrasound results    Spoke with Haylee Farmerughter to review US results that just posted  Advised of thick endometrium along with fibroid presence  She relates she was aware she had fibroids previously  She reports no menses x 5 years  She continues bleeding, wearing a depends diaper due to heaviness and continues on and off with clots  Advised she needed EMB and is agreeable  Cannot take ASA  Informed her I would reach out to collaborator for plan of care regarding bleeding and appointment
Spoke with Dr Aggie Crockett regarding continued heavy bleeding by Eileen Calles.   She will schedule consult appointment as soon as able  Called and spoke with Eileen Calles and advised that she should expect call from Dr Clair Gilbert office for consult
Discharged

## 2023-05-14 NOTE — ED PROVIDER NOTE - OBJECTIVE STATEMENT
33-year-old female chief complaint of left lower flank and left groin tenderness for 2 days.  No reported fever, no nausea, no vomiting, no dysuria, no hematuria, no vaginal bleeding.  LMP April 15, 2023.  -0-4-2.  Past medical history for ADD and anxiety.  Meds: Adderall, Wellbutrin.  Past surgical history for ovarian cystectomy.

## 2023-05-14 NOTE — ED PROVIDER NOTE - PATIENT PORTAL LINK FT
You can access the FollowMyHealth Patient Portal offered by [facility] by registering at the following website: [email or web address]. By joining [employer]’s FollowMyHealth portal, you will also be able to view your health information using other applications (apps) compatible with our system.

## 2023-05-14 NOTE — ED PROVIDER NOTE - PSYCHIATRIC, MLM
SUBJECTIVE:  Alpesh Diallo is coming in for hearing problems.  Mr. Diallo is had problems with his hearing for some time. He wanted his years check to make sure there wasn't other issues going on.  He's had an audiology evaluation of his hearing in that shows high frequency hearing loss.  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  Nursing notes reviewed.  Both TMs are normal. There is no evidence of cerumen impaction.  Both tympanic membranes are normal    ASSESSMENT:  #1 high frequency hearing loss  #2 sleep apnea    PLAN:  He's looking for compensation through the [facility] for these 2 disorders. I told him that it's going to be difficult to ascertain how his hearing loss occurred but it's not an unusual pattern and someone who 73 years old as a result of aging.  I told him I can't see any scenario in which his sleep apnea is related to his  service     Alert and oriented to person, place, time/situation. normal mood and affect. no apparent risk to self or others.

## 2023-05-14 NOTE — ED PROVIDER NOTE - NSFOLLOWUPINSTRUCTIONS_ED_ALL_ED_FT
Return in 48 hours for repeat pregnancy hormone levels.    Return if you have pain, if you have vaginal bleeding or if vaginal bleeding returns, is continuous or worsens, if you feel weak, have vomiting, any concerns. Take medications as instructed if they were prescribed. See your GYN as soon as possible (1-2 days). If you need assistance with follow up appointment, you can contact our Care Coordinator at [phone].  In addition the [facility] clinic is located at [address] [place] [address], tel [phone].

## 2023-05-14 NOTE — ED ADULT NURSE NOTE - EXTENSIONS OF SELF_ADULT
Ana Laura informed by Phone. Pt informed to go to [facility] to recheck the urine after she is finished with the antibiotic. None

## 2023-07-10 ENCOUNTER — APPOINTMENT (OUTPATIENT)
Dept: PULMONOLOGY | Facility: CLINIC | Age: 33
End: 2023-07-10

## 2023-07-14 NOTE — ED PROVIDER NOTE - CLINICAL SUMMARY MEDICAL DECISION MAKING FREE TEXT BOX
Encounter Date: 2023    SCRIBE #1 NOTE: I, Ashley Boyd, am scribing for, and in the presence of,  Herlinda Villasenor PA-C. I have scribed the following portions of the note - Other sections scribed: HPI.     History     Chief Complaint   Patient presents with    Knee Pain     Left knee pain x 3 days. Denies known injury.      The patient is a 40 y.o. obese female with no pertinent PMHx who presents to the ED for evaluation of left knee and shin pain onset 6 days ago. Patient reports that she was wearing heels at a  when her pain began and states that the pain has worsened over time.  Unsure of injury at the time.  Patient states that her knee feels unstable and reports that she feels as if she will fall due to the knee. Patient states that the pain starts at her knee and radiates to the top of her shin. Patient notes that she uses the stairs a lot at work.  No paresthesia focal weakness otherwise.  Injury or surgery to the site.          The history is provided by the patient. No  was used.   Review of patient's allergies indicates:   Allergen Reactions    Penicillins      No past medical history on file.  Past Surgical History:   Procedure Laterality Date    TUBAL LIGATION       Family History   Problem Relation Age of Onset    Hypertension Paternal Grandfather     Stroke Paternal Grandfather     Hypertension Maternal Grandmother     Stroke Maternal Grandmother     Cancer Maternal Grandfather         lung    Hypertension Maternal Grandfather     Breast cancer Neg Hx     Ovarian cancer Neg Hx      Social History     Tobacco Use    Smoking status: Never    Smokeless tobacco: Never   Substance Use Topics    Alcohol use: Yes     Comment: occassion     Drug use: No     Review of Systems   Constitutional:  Negative for chills and fever.   Eyes:  Negative for visual disturbance.   Respiratory:  Negative for shortness of breath.    Cardiovascular:  Negative for chest pain.    Gastrointestinal:  Negative for nausea and vomiting.   Genitourinary:  Negative for dysuria and flank pain.   Musculoskeletal:  Positive for arthralgias. Negative for myalgias.   Skin:  Negative for rash.   Allergic/Immunologic: Negative for immunocompromised state.   Neurological:  Negative for weakness and numbness.   Hematological:  Does not bruise/bleed easily.   Psychiatric/Behavioral:  Negative for confusion.      Physical Exam     Initial Vitals [07/13/23 1846]   BP Pulse Resp Temp SpO2   127/75 65 16 98.2 °F (36.8 °C) 99 %      MAP       --         Physical Exam    Vitals reviewed.  Constitutional: She appears well-developed and well-nourished. She is not diaphoretic. No distress.   HENT:   Head: Normocephalic and atraumatic.   Eyes: Conjunctivae and EOM are normal.   Neck: Neck supple.   Pulmonary/Chest: No respiratory distress.   Musculoskeletal:         General: Normal range of motion.      Cervical back: Neck supple.      Right knee: Normal.      Left knee: Bony tenderness present. No swelling. Tenderness present over the lateral joint line and LCL.     Neurological: She is alert and oriented to person, place, and time.       ED Course   Procedures  Labs Reviewed   POCT URINE PREGNANCY          Imaging Results              X-Ray Knee 3 View Left (Final result)  Result time 07/13/23 20:39:17      Final result by Angel Mesa MD (07/13/23 20:39:17)                   Impression:      No acute osseous abnormality identified.      Electronically signed by: Angel Mesa MD  Date:    07/13/2023  Time:    20:39               Narrative:    EXAMINATION:  XR KNEE 3 VIEW LEFT    CLINICAL HISTORY:  Pain in unspecified knee    TECHNIQUE:  AP, lateral, and Merchant views of the left knee were performed.    COMPARISON:  None    FINDINGS:  No evidence of acute displaced fracture, dislocation, or osseous destructive process.  Joint spaces are preserved.  No significant suprapatellar joint effusion.                                        Medications   ketorolac injection 15 mg (15 mg Intramuscular Given 7/13/23 2031)     Medical Decision Making:   Differential Diagnosis:   Knee sprain, meniscal injury, contusion, arthritis     APC / Resident Notes:   Patient seen in the ER promptly upon arrival she is afebrile, no acute distress.  Physical examination reveals tenderness on palpation primarily to the lateral aspect of the left knee.  Range motion of the knees fully.  No laxity noted exam.  Distal pulses intact and equal bilaterally.  Strength and sensation intact.   Scribe Attestation:   Scribe #1: I performed the above scribed service and the documentation accurately describes the services I performed. I attest to the accuracy of the note.      ED Course as of 07/13/23 2128   Thu Jul 13, 2023 2116 Knee x-ray is unremarkable for acute fracture or pathology. [AJ]   2117 Suspect ligamentous versus meniscal injury..     Will attempt knee brace placement and provide crutches. [AJ]   2128 Patient prescribed home on naproxen to use as directed.  Advised to follow-up with orthopedist provided.  Advised ice elevate and rest in the meantime.  Patient may require outpatient MRI if symptoms persist or worsen.  She was given strict precautions ED. Stable for discharge.    Disclaimer: This note has been generated using voice-recognition software. There may be typographical errors that have been missed during proof-reading.     [AJ]      ED Course User Index  [AJ] Herlinda Villasenor PA-C            IHerlinda personally performed the services described in this documentation. All medical record entries made by the scribe were at my direction and in my presence.  I have reviewed the chart and agree that the record reflects my personal performance and is accurate and complete. Herlinda Villasenor PA-C  9:28 PM 07/13/2023         Clinical Impression:   Final diagnoses:  [M25.569] Knee pain  [M25.562] Acute pain of left knee  (Primary)  [S89.92XA] Injury of left knee, initial encounter        ED Disposition Condition    Discharge Stable          ED Prescriptions       Medication Sig Dispense Start Date End Date Auth. Provider    naproxen (NAPROSYN) 500 MG tablet Take 1 tablet (500 mg total) by mouth 2 (two) times daily with meals. 20 tablet 7/13/2023 -- Herlinda Villasenor PA-C          Follow-up Information       Follow up With Specialties Details Why Contact Info    [facility]   [address] [place] [id number]  [phone]             Herlinda Villasenor PA-C  07/13/23 4213     6am- Pt is well appearing, no guarding to repeat abdominal and flank palpation.  US reported: No acute findings. Interval increase in number of small uterine fibroids with resulting mild uterine enlargement.    Case was d/w OBGYN Dr. Colon reviewed diagnostics . Informed pt to be discharged and return for repeat HCG in 48 hrs.  Pt agrees with plan.  Pt is well appearing, has no new complaints and able to walk with normal gait. Pt is stable for discharge and follow up with medical doctor. Pt educated on care and need for follow up. Discussed anticipatory guidance and return precautions. Questions answered. I had a detailed discussion with the patient regarding the historical points, exam findings, and any diagnostic results supporting the discharge diagnosis.

## 2023-12-13 ENCOUNTER — NON-APPOINTMENT (OUTPATIENT)
Age: 33
End: 2023-12-13

## 2023-12-14 ENCOUNTER — EMERGENCY (EMERGENCY)
Facility: HOSPITAL | Age: 33
LOS: 0 days | Discharge: HOME | End: 2023-12-14
Attending: EMERGENCY MEDICINE
Payer: MEDICAID

## 2023-12-14 VITALS
TEMPERATURE: 98 F | RESPIRATION RATE: 19 BRPM | WEIGHT: 149.91 LBS | SYSTOLIC BLOOD PRESSURE: 110 MMHG | HEART RATE: 75 BPM | OXYGEN SATURATION: 99 % | DIASTOLIC BLOOD PRESSURE: 65 MMHG | HEIGHT: 63 IN

## 2023-12-14 DIAGNOSIS — D68.61 ANTIPHOSPHOLIPID SYNDROME: ICD-10-CM

## 2023-12-14 DIAGNOSIS — J45.909 UNSPECIFIED ASTHMA, UNCOMPLICATED: ICD-10-CM

## 2023-12-14 DIAGNOSIS — Z91.012 ALLERGY TO EGGS: ICD-10-CM

## 2023-12-14 DIAGNOSIS — H57.89 OTHER SPECIFIED DISORDERS OF EYE AND ADNEXA: ICD-10-CM

## 2023-12-14 DIAGNOSIS — Z98.89 OTHER SPECIFIED POSTPROCEDURAL STATES: Chronic | ICD-10-CM

## 2023-12-14 DIAGNOSIS — R51.9 HEADACHE, UNSPECIFIED: ICD-10-CM

## 2023-12-14 DIAGNOSIS — R07.89 OTHER CHEST PAIN: ICD-10-CM

## 2023-12-14 DIAGNOSIS — R10.13 EPIGASTRIC PAIN: ICD-10-CM

## 2023-12-14 DIAGNOSIS — H10.9 UNSPECIFIED CONJUNCTIVITIS: ICD-10-CM

## 2023-12-14 LAB
ALBUMIN SERPL ELPH-MCNC: 3.8 G/DL — SIGNIFICANT CHANGE UP (ref 3.3–5)
ALBUMIN SERPL ELPH-MCNC: 3.8 G/DL — SIGNIFICANT CHANGE UP (ref 3.3–5)
ALP SERPL-CCNC: 54 U/L — SIGNIFICANT CHANGE UP (ref 40–120)
ALP SERPL-CCNC: 54 U/L — SIGNIFICANT CHANGE UP (ref 40–120)
ALT FLD-CCNC: 52 U/L — SIGNIFICANT CHANGE UP (ref 12–78)
ALT FLD-CCNC: 52 U/L — SIGNIFICANT CHANGE UP (ref 12–78)
ANION GAP SERPL CALC-SCNC: 6 MMOL/L — SIGNIFICANT CHANGE UP (ref 5–17)
ANION GAP SERPL CALC-SCNC: 6 MMOL/L — SIGNIFICANT CHANGE UP (ref 5–17)
APPEARANCE UR: ABNORMAL
APPEARANCE UR: ABNORMAL
APTT BLD: 34.1 SEC — SIGNIFICANT CHANGE UP (ref 24.5–35.6)
APTT BLD: 34.1 SEC — SIGNIFICANT CHANGE UP (ref 24.5–35.6)
AST SERPL-CCNC: 58 U/L — HIGH (ref 15–37)
AST SERPL-CCNC: 58 U/L — HIGH (ref 15–37)
BACTERIA # UR AUTO: ABNORMAL /HPF
BACTERIA # UR AUTO: ABNORMAL /HPF
BASOPHILS # BLD AUTO: 0.03 K/UL — SIGNIFICANT CHANGE UP (ref 0–0.2)
BASOPHILS # BLD AUTO: 0.03 K/UL — SIGNIFICANT CHANGE UP (ref 0–0.2)
BASOPHILS NFR BLD AUTO: 0.5 % — SIGNIFICANT CHANGE UP (ref 0–2)
BASOPHILS NFR BLD AUTO: 0.5 % — SIGNIFICANT CHANGE UP (ref 0–2)
BILIRUB SERPL-MCNC: 0.3 MG/DL — SIGNIFICANT CHANGE UP (ref 0.2–1.2)
BILIRUB SERPL-MCNC: 0.3 MG/DL — SIGNIFICANT CHANGE UP (ref 0.2–1.2)
BILIRUB UR-MCNC: NEGATIVE — SIGNIFICANT CHANGE UP
BILIRUB UR-MCNC: NEGATIVE — SIGNIFICANT CHANGE UP
BUN SERPL-MCNC: 11 MG/DL — SIGNIFICANT CHANGE UP (ref 7–23)
BUN SERPL-MCNC: 11 MG/DL — SIGNIFICANT CHANGE UP (ref 7–23)
CALCIUM SERPL-MCNC: 9 MG/DL — SIGNIFICANT CHANGE UP (ref 8.5–10.1)
CALCIUM SERPL-MCNC: 9 MG/DL — SIGNIFICANT CHANGE UP (ref 8.5–10.1)
CHLORIDE SERPL-SCNC: 109 MMOL/L — HIGH (ref 96–108)
CHLORIDE SERPL-SCNC: 109 MMOL/L — HIGH (ref 96–108)
CO2 SERPL-SCNC: 22 MMOL/L — SIGNIFICANT CHANGE UP (ref 22–31)
CO2 SERPL-SCNC: 22 MMOL/L — SIGNIFICANT CHANGE UP (ref 22–31)
COLOR SPEC: YELLOW — SIGNIFICANT CHANGE UP
COLOR SPEC: YELLOW — SIGNIFICANT CHANGE UP
CREAT SERPL-MCNC: 0.96 MG/DL — SIGNIFICANT CHANGE UP (ref 0.5–1.3)
CREAT SERPL-MCNC: 0.96 MG/DL — SIGNIFICANT CHANGE UP (ref 0.5–1.3)
D DIMER BLD IA.RAPID-MCNC: <150 NG/ML DDU — SIGNIFICANT CHANGE UP
D DIMER BLD IA.RAPID-MCNC: <150 NG/ML DDU — SIGNIFICANT CHANGE UP
DIFF PNL FLD: NEGATIVE — SIGNIFICANT CHANGE UP
DIFF PNL FLD: NEGATIVE — SIGNIFICANT CHANGE UP
EGFR: 79 ML/MIN/1.73M2 — SIGNIFICANT CHANGE UP
EGFR: 79 ML/MIN/1.73M2 — SIGNIFICANT CHANGE UP
EOSINOPHIL # BLD AUTO: 0.29 K/UL — SIGNIFICANT CHANGE UP (ref 0–0.5)
EOSINOPHIL # BLD AUTO: 0.29 K/UL — SIGNIFICANT CHANGE UP (ref 0–0.5)
EOSINOPHIL NFR BLD AUTO: 4.7 % — SIGNIFICANT CHANGE UP (ref 0–6)
EOSINOPHIL NFR BLD AUTO: 4.7 % — SIGNIFICANT CHANGE UP (ref 0–6)
EPI CELLS # UR: PRESENT
EPI CELLS # UR: PRESENT
GLUCOSE SERPL-MCNC: 92 MG/DL — SIGNIFICANT CHANGE UP (ref 70–99)
GLUCOSE SERPL-MCNC: 92 MG/DL — SIGNIFICANT CHANGE UP (ref 70–99)
GLUCOSE UR QL: NEGATIVE MG/DL — SIGNIFICANT CHANGE UP
GLUCOSE UR QL: NEGATIVE MG/DL — SIGNIFICANT CHANGE UP
HCG SERPL-ACNC: <1 MIU/ML — SIGNIFICANT CHANGE UP
HCG SERPL-ACNC: <1 MIU/ML — SIGNIFICANT CHANGE UP
HCT VFR BLD CALC: 37.1 % — SIGNIFICANT CHANGE UP (ref 34.5–45)
HCT VFR BLD CALC: 37.1 % — SIGNIFICANT CHANGE UP (ref 34.5–45)
HGB BLD-MCNC: 11.9 G/DL — SIGNIFICANT CHANGE UP (ref 11.5–15.5)
HGB BLD-MCNC: 11.9 G/DL — SIGNIFICANT CHANGE UP (ref 11.5–15.5)
IMM GRANULOCYTES NFR BLD AUTO: 0.3 % — SIGNIFICANT CHANGE UP (ref 0–0.9)
IMM GRANULOCYTES NFR BLD AUTO: 0.3 % — SIGNIFICANT CHANGE UP (ref 0–0.9)
INR BLD: 0.98 RATIO — SIGNIFICANT CHANGE UP (ref 0.85–1.18)
INR BLD: 0.98 RATIO — SIGNIFICANT CHANGE UP (ref 0.85–1.18)
KETONES UR-MCNC: 40 MG/DL
KETONES UR-MCNC: 40 MG/DL
LEUKOCYTE ESTERASE UR-ACNC: ABNORMAL
LEUKOCYTE ESTERASE UR-ACNC: ABNORMAL
LYMPHOCYTES # BLD AUTO: 1.83 K/UL — SIGNIFICANT CHANGE UP (ref 1–3.3)
LYMPHOCYTES # BLD AUTO: 1.83 K/UL — SIGNIFICANT CHANGE UP (ref 1–3.3)
LYMPHOCYTES # BLD AUTO: 29.9 % — SIGNIFICANT CHANGE UP (ref 13–44)
LYMPHOCYTES # BLD AUTO: 29.9 % — SIGNIFICANT CHANGE UP (ref 13–44)
MCHC RBC-ENTMCNC: 32.1 G/DL — SIGNIFICANT CHANGE UP (ref 32–36)
MCHC RBC-ENTMCNC: 32.1 G/DL — SIGNIFICANT CHANGE UP (ref 32–36)
MCHC RBC-ENTMCNC: 32.5 PG — SIGNIFICANT CHANGE UP (ref 27–34)
MCHC RBC-ENTMCNC: 32.5 PG — SIGNIFICANT CHANGE UP (ref 27–34)
MCV RBC AUTO: 101.4 FL — HIGH (ref 80–100)
MCV RBC AUTO: 101.4 FL — HIGH (ref 80–100)
MONOCYTES # BLD AUTO: 0.34 K/UL — SIGNIFICANT CHANGE UP (ref 0–0.9)
MONOCYTES # BLD AUTO: 0.34 K/UL — SIGNIFICANT CHANGE UP (ref 0–0.9)
MONOCYTES NFR BLD AUTO: 5.6 % — SIGNIFICANT CHANGE UP (ref 2–14)
MONOCYTES NFR BLD AUTO: 5.6 % — SIGNIFICANT CHANGE UP (ref 2–14)
NEUTROPHILS # BLD AUTO: 3.61 K/UL — SIGNIFICANT CHANGE UP (ref 1.8–7.4)
NEUTROPHILS # BLD AUTO: 3.61 K/UL — SIGNIFICANT CHANGE UP (ref 1.8–7.4)
NEUTROPHILS NFR BLD AUTO: 59 % — SIGNIFICANT CHANGE UP (ref 43–77)
NEUTROPHILS NFR BLD AUTO: 59 % — SIGNIFICANT CHANGE UP (ref 43–77)
NITRITE UR-MCNC: NEGATIVE — SIGNIFICANT CHANGE UP
NITRITE UR-MCNC: NEGATIVE — SIGNIFICANT CHANGE UP
NRBC # BLD: 0 /100 WBCS — SIGNIFICANT CHANGE UP (ref 0–0)
NRBC # BLD: 0 /100 WBCS — SIGNIFICANT CHANGE UP (ref 0–0)
PH UR: 6.5 — SIGNIFICANT CHANGE UP (ref 5–8)
PH UR: 6.5 — SIGNIFICANT CHANGE UP (ref 5–8)
PLATELET # BLD AUTO: 262 K/UL — SIGNIFICANT CHANGE UP (ref 150–400)
PLATELET # BLD AUTO: 262 K/UL — SIGNIFICANT CHANGE UP (ref 150–400)
POTASSIUM SERPL-MCNC: 4 MMOL/L — SIGNIFICANT CHANGE UP (ref 3.5–5.3)
POTASSIUM SERPL-MCNC: 4 MMOL/L — SIGNIFICANT CHANGE UP (ref 3.5–5.3)
POTASSIUM SERPL-SCNC: 4 MMOL/L — SIGNIFICANT CHANGE UP (ref 3.5–5.3)
POTASSIUM SERPL-SCNC: 4 MMOL/L — SIGNIFICANT CHANGE UP (ref 3.5–5.3)
PROT SERPL-MCNC: 7.5 GM/DL — SIGNIFICANT CHANGE UP (ref 6–8.3)
PROT SERPL-MCNC: 7.5 GM/DL — SIGNIFICANT CHANGE UP (ref 6–8.3)
PROT UR-MCNC: 100 MG/DL
PROT UR-MCNC: 100 MG/DL
PROTHROM AB SERPL-ACNC: 11.7 SEC — SIGNIFICANT CHANGE UP (ref 9.5–13)
PROTHROM AB SERPL-ACNC: 11.7 SEC — SIGNIFICANT CHANGE UP (ref 9.5–13)
RAPID RVP RESULT: SIGNIFICANT CHANGE UP
RAPID RVP RESULT: SIGNIFICANT CHANGE UP
RBC # BLD: 3.66 M/UL — LOW (ref 3.8–5.2)
RBC # BLD: 3.66 M/UL — LOW (ref 3.8–5.2)
RBC # FLD: 12.5 % — SIGNIFICANT CHANGE UP (ref 10.3–14.5)
RBC # FLD: 12.5 % — SIGNIFICANT CHANGE UP (ref 10.3–14.5)
RBC CASTS # UR COMP ASSIST: SIGNIFICANT CHANGE UP /HPF (ref 0–4)
RBC CASTS # UR COMP ASSIST: SIGNIFICANT CHANGE UP /HPF (ref 0–4)
SARS-COV-2 RNA SPEC QL NAA+PROBE: SIGNIFICANT CHANGE UP
SARS-COV-2 RNA SPEC QL NAA+PROBE: SIGNIFICANT CHANGE UP
SODIUM SERPL-SCNC: 137 MMOL/L — SIGNIFICANT CHANGE UP (ref 135–145)
SODIUM SERPL-SCNC: 137 MMOL/L — SIGNIFICANT CHANGE UP (ref 135–145)
SP GR SPEC: >1.03 — HIGH (ref 1–1.03)
SP GR SPEC: >1.03 — HIGH (ref 1–1.03)
TROPONIN I, HIGH SENSITIVITY RESULT: 4 NG/L — SIGNIFICANT CHANGE UP
TROPONIN I, HIGH SENSITIVITY RESULT: 4 NG/L — SIGNIFICANT CHANGE UP
UROBILINOGEN FLD QL: 1 MG/DL — SIGNIFICANT CHANGE UP (ref 0.2–1)
UROBILINOGEN FLD QL: 1 MG/DL — SIGNIFICANT CHANGE UP (ref 0.2–1)
WBC # BLD: 6.32 K/UL — SIGNIFICANT CHANGE UP (ref 3.8–10.5)
WBC # BLD: 6.32 K/UL — SIGNIFICANT CHANGE UP (ref 3.8–10.5)
WBC # FLD AUTO: 6.32 K/UL — SIGNIFICANT CHANGE UP (ref 3.8–10.5)
WBC # FLD AUTO: 6.32 K/UL — SIGNIFICANT CHANGE UP (ref 3.8–10.5)
WBC UR QL: SIGNIFICANT CHANGE UP /HPF (ref 0–5)
WBC UR QL: SIGNIFICANT CHANGE UP /HPF (ref 0–5)

## 2023-12-14 PROCEDURE — 70450 CT HEAD/BRAIN W/O DYE: CPT | Mod: 26,MA

## 2023-12-14 PROCEDURE — 71045 X-RAY EXAM CHEST 1 VIEW: CPT | Mod: 26

## 2023-12-14 PROCEDURE — 99284 EMERGENCY DEPT VISIT MOD MDM: CPT

## 2023-12-14 PROCEDURE — 93010 ELECTROCARDIOGRAM REPORT: CPT

## 2023-12-14 RX ORDER — FAMOTIDINE 10 MG/ML
20 INJECTION INTRAVENOUS ONCE
Refills: 0 | Status: COMPLETED | OUTPATIENT
Start: 2023-12-14 | End: 2023-12-14

## 2023-12-14 RX ORDER — SODIUM CHLORIDE 9 MG/ML
1000 INJECTION INTRAMUSCULAR; INTRAVENOUS; SUBCUTANEOUS ONCE
Refills: 0 | Status: COMPLETED | OUTPATIENT
Start: 2023-12-14 | End: 2023-12-14

## 2023-12-14 RX ADMIN — SODIUM CHLORIDE 1000 MILLILITER(S): 9 INJECTION INTRAMUSCULAR; INTRAVENOUS; SUBCUTANEOUS at 16:52

## 2023-12-14 RX ADMIN — FAMOTIDINE 20 MILLIGRAM(S): 10 INJECTION INTRAVENOUS at 16:20

## 2023-12-14 RX ADMIN — Medication 30 MILLILITER(S): at 16:20

## 2023-12-14 NOTE — ED PROVIDER NOTE - EYES, MLM
Clear bilaterally, pupils equal, round and reactive to light. Clear bilaterally, pupils equal, round and reactive to light. Pink tinge to R eye noted on sclera, vision otherwise intact, EOMI

## 2023-12-14 NOTE — ED ADULT NURSE NOTE - NSFALLUNIVINTERV_ED_ALL_ED
Bed/Stretcher in lowest position, wheels locked, appropriate side rails in place/Call bell, personal items and telephone in reach/Instruct patient to call for assistance before getting out of bed/chair/stretcher/Non-slip footwear applied when patient is off stretcher/[place] to call system/Physically safe environment - no spills, clutter or unnecessary equipment/Purposeful proactive rounding/Room/bathroom lighting operational, light cord in reach Bed/Stretcher in lowest position, wheels locked, appropriate side rails in place/Call bell, personal items and telephone in reach/Instruct patient to call for assistance before getting out of bed/chair/stretcher/Non-slip footwear applied when patient is off stretcher/[place] to call system/Physically safe environment - no spills, clutter or unnecessary equipment/Purposeful proactive rounding/Room/bathroom lighting operational, light cord in reach

## 2023-12-14 NOTE — ED PROVIDER NOTE - PATIENT PORTAL LINK FT
You can access the FollowMyHealth Patient Portal offered by [facility] by registering at the following website: [email or web address]. By joining [email or web address]’s FollowMyHealth portal, you will also be able to view your health information using other applications (apps) compatible with our system. You can access the FollowMyHealth Patient Portal offered by [facility] by registering at the following website: [email or web address]. By joining [employer]’s FollowMyHealth portal, you will also be able to view your health information using other applications (apps) compatible with our system.

## 2023-12-14 NOTE — ED ADULT TRIAGE NOTE - CHIEF COMPLAINT QUOTE
BIBEMS from urgent care c/o intermittent chest pain radiating to back and headache x 1 week. R eye pain since last night. Pt denies numbness/tingling. Pt given 2 baby aspirin. PMH Antiphospholipid syndrome.  LMP 11/28.

## 2023-12-14 NOTE — ED PROVIDER NOTE - CARE PLAN
Principal Discharge DX:	Chest pain  Secondary Diagnosis:	Headache   1 Principal Discharge DX:	Chest pain  Secondary Diagnosis:	Headache  Secondary Diagnosis:	Conjunctivitis, right eye

## 2023-12-14 NOTE — ED PROVIDER NOTE - CLINICAL SUMMARY MEDICAL DECISION MAKING FREE TEXT BOX
pt with nonspecific CP otherwise Trop evaluated and D-dimer negative - otherwise xray labwork without notable abnormality - UA borderline but no dysuria, otherwise offered US pelvis as pt had nonspecific pain - later states she did not want it - will follow up with outpt PMD and OB/GYN as needed. pt with nonspecific CP otherwise Trop evaluated and D-dimer negative - otherwise xray labwork without notable abnormality - UA borderline but no dysuria, otherwise offered US pelvis as pt had nonspecific pain - later states she did not want it - will follow up with outpt PMD and OB/GYN as needed. Likely conjunctivitis viral entity.

## 2023-12-14 NOTE — ED ADULT NURSE NOTE - OBJECTIVE STATEMENT
As per pt she had CP that started yesterday, increasingly SOB over the past few weeks. Also noted to have redness and blurry vision to R eye, Hx of lipid problems with pregnancy, NKA. pt AAOx4, denies pregnancy. No fevers, cough nausea or vomiting.

## 2023-12-14 NOTE — ED PROVIDER NOTE - OBJECTIVE STATEMENT
33-year-old female with history of antiphospholipid syndrome, asthma, ovarian cyst history otherwise presenting to ER due to recurrent epigastric/chest pain for the past week lasting about 15 minutes at a time.  Patient otherwise states she also had mild headache and some redness of the right eye that also developed in the past day so came into the ED for evaluation after having been seen at an urgent care.  Patient currently with chest pain and discomfort improved no active discomfort here in ED.

## 2023-12-15 LAB
CULTURE RESULTS: SIGNIFICANT CHANGE UP
CULTURE RESULTS: SIGNIFICANT CHANGE UP
SPECIMEN SOURCE: SIGNIFICANT CHANGE UP
SPECIMEN SOURCE: SIGNIFICANT CHANGE UP

## 2024-01-29 ENCOUNTER — NON-APPOINTMENT (OUTPATIENT)
Age: 34
End: 2024-01-29

## 2024-04-29 NOTE — ED PROVIDER NOTE - PHYSICAL EXAMINATION
used Left lower flank and left lower quadrant tenderness, no rebound, no rigidity. Pt signals to left lower flank and left lower quadrant as location of tenderness, no guarding, no rebound, no rigidity.

## 2024-08-19 NOTE — ED PROVIDER NOTE - OBJECTIVE STATEMENT
"Mona Paniaguasabino Mejía was seen and treated in our emergency department on 8/19/2024.  She may return to school on 08/21/2024.      If you have any questions or concerns, please don't hesitate to call.      Jolanta Evans MD"
The patient is a 28y/o F hx of asthma and sinusitis p/w c/o intermittent wheezing, frontal facial pain, and associated rhinorrhea. The patient is currently visiting her son in the PICU for asthma exacerbation and she began to have symptoms herself. The patient denies any chest pain, f/c/n/v/d/c.

## 2024-11-23 NOTE — ED PROVIDER NOTE - CROS ED NEURO ALL NEG
While patient only had minimally elevated troponin levels with negative delta troponin, patient did have widespread ST-T abnormalities on EKG with no previous EKG for comparison.  Patient has LAN score of 3 points which places her at 13% risk at 14 days of all cause mortality, new or recurrent MI, or severe recurrent ischemia requiring urgent revascularization and as such will start patient on ACS protocol.  Cardiology will be consulted    11/22:  Patient did not require intervention during left heart catheterization today.  Will continue on current cardiovascular medications though.  Follow up with Cardiology for discharge recommendations.     negative...

## 2025-07-21 NOTE — ED PROVIDER NOTE - PROGRESS NOTE DETAILS
Pt viewed results via [employer] [facility]. Voicemail left to call with any questions.   Results reported to patient--grossly benign, xr clear, labs unremarkable   Pt. reports feeling better after meds, wheezing improved, no resp distress now, no retractions, speaking in full sentences without issue   pt. agrees to f/u with primary care outpt., referred to pulm for additional f/u   pt. understands to return to ED if symptoms worsen; will d/c with meds for asthma